# Patient Record
Sex: FEMALE | Race: WHITE | URBAN - METROPOLITAN AREA
[De-identification: names, ages, dates, MRNs, and addresses within clinical notes are randomized per-mention and may not be internally consistent; named-entity substitution may affect disease eponyms.]

---

## 2021-12-23 ENCOUNTER — HOSPITAL ENCOUNTER (EMERGENCY)
Age: 86
Discharge: LEFT BEFORE DISCHARGE | End: 2021-12-23
Attending: EMERGENCY MEDICINE
Payer: MEDICARE

## 2021-12-23 ENCOUNTER — APPOINTMENT (OUTPATIENT)
Dept: GENERAL RADIOLOGY | Age: 86
End: 2021-12-23
Payer: MEDICARE

## 2021-12-23 VITALS
RESPIRATION RATE: 16 BRPM | WEIGHT: 134 LBS | DIASTOLIC BLOOD PRESSURE: 80 MMHG | TEMPERATURE: 98.9 F | BODY MASS INDEX: 22.33 KG/M2 | OXYGEN SATURATION: 96 % | HEART RATE: 84 BPM | SYSTOLIC BLOOD PRESSURE: 135 MMHG | HEIGHT: 65 IN

## 2021-12-23 LAB — SARS-COV-2, NAAT: NOT DETECTED

## 2021-12-23 PROCEDURE — 4500000002 HC ER NO CHARGE

## 2021-12-23 PROCEDURE — 87635 SARS-COV-2 COVID-19 AMP PRB: CPT

## 2021-12-23 PROCEDURE — 71046 X-RAY EXAM CHEST 2 VIEWS: CPT

## 2021-12-23 NOTE — ED TRIAGE NOTES
A & Ox4. Skin pink warm and dry. Harsh cough noted. Denies coughing anything up. Daughter states she has been sleeping all day and not eating anything over the last few days.

## 2024-08-25 ENCOUNTER — APPOINTMENT (OUTPATIENT)
Dept: CARDIOLOGY | Facility: HOSPITAL | Age: 89
End: 2024-08-25
Payer: MEDICARE

## 2024-08-25 ENCOUNTER — APPOINTMENT (OUTPATIENT)
Dept: RADIOLOGY | Facility: HOSPITAL | Age: 89
End: 2024-08-25
Payer: MEDICARE

## 2024-08-25 ENCOUNTER — HOSPITAL ENCOUNTER (INPATIENT)
Facility: HOSPITAL | Age: 89
LOS: 3 days | Discharge: NURSING, REHAB OR LONG-TERM CARE | End: 2024-08-28
Attending: STUDENT IN AN ORGANIZED HEALTH CARE EDUCATION/TRAINING PROGRAM | Admitting: INTERNAL MEDICINE
Payer: MEDICARE
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
## 2024-08-25 DIAGNOSIS — S72.001A CLOSED FRACTURE OF NECK OF RIGHT FEMUR, INITIAL ENCOUNTER (MULTI): ICD-10-CM

## 2024-08-25 DIAGNOSIS — E87.6 HYPOKALEMIA: ICD-10-CM

## 2024-08-25 LAB
ALBUMIN SERPL BCP-MCNC: 3.9 G/DL (ref 3.4–5)
ALP SERPL-CCNC: 119 U/L (ref 33–136)
ALT SERPL W P-5'-P-CCNC: 4 U/L (ref 7–45)
ANION GAP SERPL CALC-SCNC: 17 MMOL/L (ref 10–20)
APPEARANCE UR: CLEAR
AST SERPL W P-5'-P-CCNC: 18 U/L (ref 9–39)
BASOPHILS # BLD AUTO: 0.03 X10*3/UL (ref 0–0.1)
BASOPHILS NFR BLD AUTO: 0.3 %
BILIRUB SERPL-MCNC: 1.3 MG/DL (ref 0–1.2)
BILIRUB UR STRIP.AUTO-MCNC: NEGATIVE MG/DL
BUN SERPL-MCNC: 7 MG/DL (ref 6–23)
CALCIUM SERPL-MCNC: 9.1 MG/DL (ref 8.6–10.3)
CARDIAC TROPONIN I PNL SERPL HS: 17 NG/L (ref 0–13)
CARDIAC TROPONIN I PNL SERPL HS: 17 NG/L (ref 0–13)
CARDIAC TROPONIN I PNL SERPL HS: 20 NG/L (ref 0–13)
CHLORIDE SERPL-SCNC: 103 MMOL/L (ref 98–107)
CO2 SERPL-SCNC: 23 MMOL/L (ref 21–32)
COLOR UR: COLORLESS
CREAT SERPL-MCNC: 0.63 MG/DL (ref 0.5–1.05)
EGFRCR SERPLBLD CKD-EPI 2021: 81 ML/MIN/1.73M*2
EOSINOPHIL # BLD AUTO: 0.24 X10*3/UL (ref 0–0.4)
EOSINOPHIL NFR BLD AUTO: 2.8 %
ERYTHROCYTE [DISTWIDTH] IN BLOOD BY AUTOMATED COUNT: 12.8 % (ref 11.5–14.5)
GLUCOSE SERPL-MCNC: 157 MG/DL (ref 74–99)
GLUCOSE UR STRIP.AUTO-MCNC: NORMAL MG/DL
HCT VFR BLD AUTO: 40.3 % (ref 36–46)
HGB BLD-MCNC: 13.7 G/DL (ref 12–16)
HOLD SPECIMEN: NORMAL
IMM GRANULOCYTES # BLD AUTO: 0.07 X10*3/UL (ref 0–0.5)
IMM GRANULOCYTES NFR BLD AUTO: 0.8 % (ref 0–0.9)
KETONES UR STRIP.AUTO-MCNC: ABNORMAL MG/DL
LEUKOCYTE ESTERASE UR QL STRIP.AUTO: ABNORMAL
LYMPHOCYTES # BLD AUTO: 2.2 X10*3/UL (ref 0.8–3)
LYMPHOCYTES NFR BLD AUTO: 25.3 %
MAGNESIUM SERPL-MCNC: 1.7 MG/DL (ref 1.6–2.4)
MCH RBC QN AUTO: 30.5 PG (ref 26–34)
MCHC RBC AUTO-ENTMCNC: 34 G/DL (ref 32–36)
MCV RBC AUTO: 90 FL (ref 80–100)
MONOCYTES # BLD AUTO: 0.82 X10*3/UL (ref 0.05–0.8)
MONOCYTES NFR BLD AUTO: 9.4 %
NEUTROPHILS # BLD AUTO: 5.33 X10*3/UL (ref 1.6–5.5)
NEUTROPHILS NFR BLD AUTO: 61.4 %
NITRITE UR QL STRIP.AUTO: NEGATIVE
NRBC BLD-RTO: 0 /100 WBCS (ref 0–0)
PH UR STRIP.AUTO: 6.5 [PH]
PLATELET # BLD AUTO: 321 X10*3/UL (ref 150–450)
POTASSIUM SERPL-SCNC: 2.9 MMOL/L (ref 3.5–5.3)
PROT SERPL-MCNC: 7.1 G/DL (ref 6.4–8.2)
PROT UR STRIP.AUTO-MCNC: NEGATIVE MG/DL
RBC # BLD AUTO: 4.49 X10*6/UL (ref 4–5.2)
RBC # UR STRIP.AUTO: NEGATIVE /UL
RBC #/AREA URNS AUTO: NORMAL /HPF
SODIUM SERPL-SCNC: 140 MMOL/L (ref 136–145)
SP GR UR STRIP.AUTO: 1.01
UROBILINOGEN UR STRIP.AUTO-MCNC: NORMAL MG/DL
WBC # BLD AUTO: 8.7 X10*3/UL (ref 4.4–11.3)
WBC #/AREA URNS AUTO: NORMAL /HPF

## 2024-08-25 PROCEDURE — 70450 CT HEAD/BRAIN W/O DYE: CPT | Performed by: RADIOLOGY

## 2024-08-25 PROCEDURE — 2500000004 HC RX 250 GENERAL PHARMACY W/ HCPCS (ALT 636 FOR OP/ED)

## 2024-08-25 PROCEDURE — 71045 X-RAY EXAM CHEST 1 VIEW: CPT | Performed by: RADIOLOGY

## 2024-08-25 PROCEDURE — 96375 TX/PRO/DX INJ NEW DRUG ADDON: CPT

## 2024-08-25 PROCEDURE — 72128 CT CHEST SPINE W/O DYE: CPT | Performed by: RADIOLOGY

## 2024-08-25 PROCEDURE — 71045 X-RAY EXAM CHEST 1 VIEW: CPT

## 2024-08-25 PROCEDURE — 93005 ELECTROCARDIOGRAM TRACING: CPT

## 2024-08-25 PROCEDURE — 99285 EMERGENCY DEPT VISIT HI MDM: CPT | Mod: 25

## 2024-08-25 PROCEDURE — 84075 ASSAY ALKALINE PHOSPHATASE: CPT

## 2024-08-25 PROCEDURE — 72131 CT LUMBAR SPINE W/O DYE: CPT | Performed by: RADIOLOGY

## 2024-08-25 PROCEDURE — 1100000001 HC PRIVATE ROOM DAILY

## 2024-08-25 PROCEDURE — 73552 X-RAY EXAM OF FEMUR 2/>: CPT | Mod: RIGHT SIDE | Performed by: RADIOLOGY

## 2024-08-25 PROCEDURE — 81001 URINALYSIS AUTO W/SCOPE: CPT

## 2024-08-25 PROCEDURE — 73552 X-RAY EXAM OF FEMUR 2/>: CPT | Mod: RT

## 2024-08-25 PROCEDURE — 2500000001 HC RX 250 WO HCPCS SELF ADMINISTERED DRUGS (ALT 637 FOR MEDICARE OP)

## 2024-08-25 PROCEDURE — 85025 COMPLETE CBC W/AUTO DIFF WBC: CPT

## 2024-08-25 PROCEDURE — 96374 THER/PROPH/DIAG INJ IV PUSH: CPT

## 2024-08-25 PROCEDURE — 72125 CT NECK SPINE W/O DYE: CPT

## 2024-08-25 PROCEDURE — 70450 CT HEAD/BRAIN W/O DYE: CPT

## 2024-08-25 PROCEDURE — 36415 COLL VENOUS BLD VENIPUNCTURE: CPT

## 2024-08-25 PROCEDURE — 72131 CT LUMBAR SPINE W/O DYE: CPT

## 2024-08-25 PROCEDURE — 87086 URINE CULTURE/COLONY COUNT: CPT | Mod: PARLAB

## 2024-08-25 PROCEDURE — 83735 ASSAY OF MAGNESIUM: CPT

## 2024-08-25 PROCEDURE — 72128 CT CHEST SPINE W/O DYE: CPT

## 2024-08-25 PROCEDURE — 84484 ASSAY OF TROPONIN QUANT: CPT

## 2024-08-25 PROCEDURE — 72125 CT NECK SPINE W/O DYE: CPT | Performed by: RADIOLOGY

## 2024-08-25 PROCEDURE — 72170 X-RAY EXAM OF PELVIS: CPT | Performed by: RADIOLOGY

## 2024-08-25 PROCEDURE — 72170 X-RAY EXAM OF PELVIS: CPT

## 2024-08-25 RX ORDER — KETOROLAC TROMETHAMINE 30 MG/ML
7.5 INJECTION, SOLUTION INTRAMUSCULAR; INTRAVENOUS EVERY 6 HOURS PRN
Status: DISCONTINUED | OUTPATIENT
Start: 2024-08-25 | End: 2024-08-25

## 2024-08-25 RX ORDER — HYDRALAZINE HYDROCHLORIDE 20 MG/ML
5 INJECTION INTRAMUSCULAR; INTRAVENOUS EVERY 6 HOURS PRN
Status: DISCONTINUED | OUTPATIENT
Start: 2024-08-25 | End: 2024-08-28 | Stop reason: HOSPADM

## 2024-08-25 RX ORDER — ACETAMINOPHEN 160 MG/5ML
650 SOLUTION ORAL EVERY 4 HOURS PRN
Status: DISCONTINUED | OUTPATIENT
Start: 2024-08-25 | End: 2024-08-28 | Stop reason: HOSPADM

## 2024-08-25 RX ORDER — ACETAMINOPHEN 650 MG/1
650 SUPPOSITORY RECTAL EVERY 4 HOURS PRN
Status: DISCONTINUED | OUTPATIENT
Start: 2024-08-25 | End: 2024-08-28 | Stop reason: HOSPADM

## 2024-08-25 RX ORDER — POTASSIUM CHLORIDE 1.5 G/1.58G
20 POWDER, FOR SOLUTION ORAL ONCE
Status: COMPLETED | OUTPATIENT
Start: 2024-08-25 | End: 2024-08-25

## 2024-08-25 RX ORDER — POTASSIUM CHLORIDE 14.9 MG/ML
20 INJECTION INTRAVENOUS
Status: COMPLETED | OUTPATIENT
Start: 2024-08-25 | End: 2024-08-25

## 2024-08-25 RX ORDER — POLYETHYLENE GLYCOL 3350 17 G/17G
17 POWDER, FOR SOLUTION ORAL DAILY PRN
Status: DISCONTINUED | OUTPATIENT
Start: 2024-08-25 | End: 2024-08-28 | Stop reason: HOSPADM

## 2024-08-25 RX ORDER — METOPROLOL TARTRATE 1 MG/ML
5 INJECTION, SOLUTION INTRAVENOUS EVERY 6 HOURS PRN
Status: DISCONTINUED | OUTPATIENT
Start: 2024-08-25 | End: 2024-08-25

## 2024-08-25 RX ORDER — MORPHINE SULFATE 2 MG/ML
1 INJECTION, SOLUTION INTRAMUSCULAR; INTRAVENOUS EVERY 4 HOURS PRN
Status: DISCONTINUED | OUTPATIENT
Start: 2024-08-25 | End: 2024-08-28 | Stop reason: HOSPADM

## 2024-08-25 RX ORDER — MORPHINE SULFATE 2 MG/ML
2 INJECTION, SOLUTION INTRAMUSCULAR; INTRAVENOUS ONCE
Status: COMPLETED | OUTPATIENT
Start: 2024-08-25 | End: 2024-08-25

## 2024-08-25 RX ORDER — LEVOTHYROXINE SODIUM 50 UG/1
50 TABLET ORAL DAILY
Status: DISCONTINUED | OUTPATIENT
Start: 2024-08-26 | End: 2024-08-28 | Stop reason: HOSPADM

## 2024-08-25 RX ORDER — ACETAMINOPHEN 325 MG/1
650 TABLET ORAL EVERY 6 HOURS PRN
Status: DISCONTINUED | OUTPATIENT
Start: 2024-08-25 | End: 2024-08-28 | Stop reason: HOSPADM

## 2024-08-25 RX ORDER — ACETAMINOPHEN 500 MG
5 TABLET ORAL NIGHTLY PRN
Status: DISCONTINUED | OUTPATIENT
Start: 2024-08-25 | End: 2024-08-28 | Stop reason: HOSPADM

## 2024-08-25 SDOH — SOCIAL STABILITY: SOCIAL INSECURITY: WERE YOU ABLE TO COMPLETE ALL THE BEHAVIORAL HEALTH SCREENINGS?: YES

## 2024-08-25 SDOH — SOCIAL STABILITY: SOCIAL INSECURITY: DO YOU FEEL ANYONE HAS EXPLOITED OR TAKEN ADVANTAGE OF YOU FINANCIALLY OR OF YOUR PERSONAL PROPERTY?: NO

## 2024-08-25 SDOH — SOCIAL STABILITY: SOCIAL INSECURITY: DOES ANYONE TRY TO KEEP YOU FROM HAVING/CONTACTING OTHER FRIENDS OR DOING THINGS OUTSIDE YOUR HOME?: NO

## 2024-08-25 SDOH — SOCIAL STABILITY: SOCIAL INSECURITY: HAS ANYONE EVER THREATENED TO HURT YOUR FAMILY OR YOUR PETS?: NO

## 2024-08-25 SDOH — SOCIAL STABILITY: SOCIAL INSECURITY: ARE THERE ANY APPARENT SIGNS OF INJURIES/BEHAVIORS THAT COULD BE RELATED TO ABUSE/NEGLECT?: NO

## 2024-08-25 SDOH — SOCIAL STABILITY: SOCIAL INSECURITY: HAVE YOU HAD THOUGHTS OF HARMING ANYONE ELSE?: NO

## 2024-08-25 SDOH — SOCIAL STABILITY: SOCIAL INSECURITY: HAVE YOU HAD ANY THOUGHTS OF HARMING ANYONE ELSE?: NO

## 2024-08-25 SDOH — SOCIAL STABILITY: SOCIAL INSECURITY: DO YOU FEEL UNSAFE GOING BACK TO THE PLACE WHERE YOU ARE LIVING?: NO

## 2024-08-25 SDOH — SOCIAL STABILITY: SOCIAL INSECURITY: ARE YOU OR HAVE YOU BEEN THREATENED OR ABUSED PHYSICALLY, EMOTIONALLY, OR SEXUALLY BY ANYONE?: NO

## 2024-08-25 SDOH — SOCIAL STABILITY: SOCIAL INSECURITY: ABUSE: ADULT

## 2024-08-25 ASSESSMENT — COLUMBIA-SUICIDE SEVERITY RATING SCALE - C-SSRS
6. HAVE YOU EVER DONE ANYTHING, STARTED TO DO ANYTHING, OR PREPARED TO DO ANYTHING TO END YOUR LIFE?: NO
1. SINCE LAST CONTACT, HAVE YOU WISHED YOU WERE DEAD OR WISHED YOU COULD GO TO SLEEP AND NOT WAKE UP?: NO
2. HAVE YOU ACTUALLY HAD ANY THOUGHTS OF KILLING YOURSELF?: NO
1. IN THE PAST MONTH, HAVE YOU WISHED YOU WERE DEAD OR WISHED YOU COULD GO TO SLEEP AND NOT WAKE UP?: NO
2. HAVE YOU ACTUALLY HAD ANY THOUGHTS OF KILLING YOURSELF?: NO
6. HAVE YOU EVER DONE ANYTHING, STARTED TO DO ANYTHING, OR PREPARED TO DO ANYTHING TO END YOUR LIFE?: NO

## 2024-08-25 ASSESSMENT — PAIN - FUNCTIONAL ASSESSMENT
PAIN_FUNCTIONAL_ASSESSMENT: 0-10

## 2024-08-25 ASSESSMENT — PAIN SCALES - GENERAL
PAINLEVEL_OUTOF10: 6
PAINLEVEL_OUTOF10: 3
PAINLEVEL_OUTOF10: 0 - NO PAIN
PAINLEVEL_OUTOF10: 3
PAINLEVEL_OUTOF10: 0 - NO PAIN

## 2024-08-25 ASSESSMENT — LIFESTYLE VARIABLES
HOW OFTEN DO YOU HAVE 6 OR MORE DRINKS ON ONE OCCASION: NEVER
AUDIT-C TOTAL SCORE: 0
SUBSTANCE_ABUSE_PAST_12_MONTHS: NO
HOW MANY STANDARD DRINKS CONTAINING ALCOHOL DO YOU HAVE ON A TYPICAL DAY: PATIENT DOES NOT DRINK
TOTAL SCORE: 0
AUDIT-C TOTAL SCORE: 0
SKIP TO QUESTIONS 9-10: 1
EVER HAD A DRINK FIRST THING IN THE MORNING TO STEADY YOUR NERVES TO GET RID OF A HANGOVER: NO
HAVE YOU EVER FELT YOU SHOULD CUT DOWN ON YOUR DRINKING: NO
HAVE PEOPLE ANNOYED YOU BY CRITICIZING YOUR DRINKING: NO
EVER FELT BAD OR GUILTY ABOUT YOUR DRINKING: NO
HOW OFTEN DO YOU HAVE A DRINK CONTAINING ALCOHOL: NEVER
PRESCIPTION_ABUSE_PAST_12_MONTHS: NO

## 2024-08-25 ASSESSMENT — ACTIVITIES OF DAILY LIVING (ADL)
EFFECT OF PAIN ON DAILY ACTIVITIES: RESTING
LACK_OF_TRANSPORTATION: NO
JUDGMENT_ADEQUATE_SAFELY_COMPLETE_DAILY_ACTIVITIES: NO
WALKS IN HOME: NEEDS ASSISTANCE
TOILETING: DEPENDENT
HEARING - LEFT EAR: HEARING AID
PATIENT'S MEMORY ADEQUATE TO SAFELY COMPLETE DAILY ACTIVITIES?: NO
GROOMING: DEPENDENT
FEEDING YOURSELF: INDEPENDENT
ADEQUATE_TO_COMPLETE_ADL: YES
DRESSING YOURSELF: DEPENDENT
BATHING: DEPENDENT
HEARING - RIGHT EAR: HEARING AID

## 2024-08-25 ASSESSMENT — PATIENT HEALTH QUESTIONNAIRE - PHQ9
1. LITTLE INTEREST OR PLEASURE IN DOING THINGS: NOT AT ALL
2. FEELING DOWN, DEPRESSED OR HOPELESS: NOT AT ALL
SUM OF ALL RESPONSES TO PHQ9 QUESTIONS 1 & 2: 0

## 2024-08-25 ASSESSMENT — PAIN DESCRIPTION - LOCATION: LOCATION: HIP

## 2024-08-25 ASSESSMENT — COGNITIVE AND FUNCTIONAL STATUS - GENERAL
DAILY ACTIVITIY SCORE: 15
MOVING FROM LYING ON BACK TO SITTING ON SIDE OF FLAT BED WITH BEDRAILS: A LITTLE
PATIENT BASELINE BEDBOUND: NO
MOBILITY SCORE: 15
TURNING FROM BACK TO SIDE WHILE IN FLAT BAD: A LITTLE
WALKING IN HOSPITAL ROOM: A LOT
DRESSING REGULAR LOWER BODY CLOTHING: A LOT
PERSONAL GROOMING: A LITTLE
TOILETING: A LOT
DRESSING REGULAR UPPER BODY CLOTHING: A LITTLE
HELP NEEDED FOR BATHING: A LOT
STANDING UP FROM CHAIR USING ARMS: A LITTLE
MOVING TO AND FROM BED TO CHAIR: A LOT
EATING MEALS: A LITTLE
CLIMB 3 TO 5 STEPS WITH RAILING: A LOT

## 2024-08-25 NOTE — ED NOTES
Olvin called stated she was daughter in law of patient. Advised that I cannot release information on the patient without patient consent. (Patient is not in the room to consent at the time of the call.) olvin stated she was just going to have her grandson come up to the hospital and she will be coming after, and hung up.      Betsy Oliver RN  08/25/24 0619

## 2024-08-25 NOTE — CARE PLAN
The patient's goals for the shift include      The clinical goals for the shift include pt to remain hemodynamically stable throughout duration of shift      Problem: Skin  Goal: Decreased wound size/increased tissue granulation at next dressing change  Outcome: Progressing  Flowsheets (Taken 8/25/2024 1403)  Decreased wound size/increased tissue granulation at next dressing change: Promote sleep for wound healing  Goal: Participates in plan/prevention/treatment measures  Outcome: Progressing  Flowsheets (Taken 8/25/2024 1403)  Participates in plan/prevention/treatment measures: Elevate heels  Goal: Prevent/manage excess moisture  Outcome: Progressing  Flowsheets (Taken 8/25/2024 1403)  Prevent/manage excess moisture: Cleanse incontinence/protect with barrier cream  Goal: Prevent/minimize sheer/friction injuries  Outcome: Progressing  Flowsheets (Taken 8/25/2024 1403)  Prevent/minimize sheer/friction injuries: Use pull sheet  Goal: Promote/optimize nutrition  Outcome: Progressing  Flowsheets (Taken 8/25/2024 1403)  Promote/optimize nutrition: Consume > 50% meals/supplements  Goal: Promote skin healing  Outcome: Progressing  Flowsheets (Taken 8/25/2024 1403)  Promote skin healing: Assess skin/pad under line(s)/device(s)     Problem: Fall/Injury  Goal: Not fall by end of shift  Outcome: Progressing  Goal: Be free from injury by end of the shift  Outcome: Progressing  Goal: Verbalize understanding of personal risk factors for fall in the hospital  Outcome: Progressing  Goal: Verbalize understanding of risk factor reduction measures to prevent injury from fall in the home  Outcome: Progressing  Goal: Use assistive devices by end of the shift  Outcome: Progressing  Goal: Pace activities to prevent fatigue by end of the shift  Outcome: Progressing

## 2024-08-25 NOTE — H&P
History Of Present Illness  Della Gordillo is a 89 y.o. female with a past medical history of DDD (cervical), hypercholesterolemia, hyperlipidemia, hypothyroidism, osteoporosis, and recent L1 compression fracture who presents to the emergency department today by ambulance from the [facility] for a fall.  Per EMS, they were called to the [facility] this morning after patient was found on the floor out of bed. Upon my arrival, I have just woken patient out of sleep, so she is mildly confused.  She asked me where the dog is.  I believe she was just dreaming, because when I asked her her name, where she was, and what month it was, she answered all 3 questions correctly.  She is hard of hearing.  She tells me this morning that she fell out of bed, but does not know how.  She denies feeling dizzy or passing out before hand.  She does state that she hit her head, but denies LOC and use of blood thinners.  She does endorse a mild cough, but states she is not coughing anything up.  She denies fever/chills, chest pain, shortness of breath, palpitations, leg swelling, nausea/vomiting, diarrhea, urinary symptoms, numbness/tingling.  She states that she takes her medications daily, and that the staff at the [facility] watches her take them.  She states she is eating and drinking appropriately.  She states she uses a wheelchair to get around, but it is kept in a corner of her room that she cannot get to.  I informed her that she has broken her hip, and patient states she does not want any surgery. Patient has DNRCC form in her nursing home paperwork.    ED course: On arrival, patient's blood pressure 176/117, heart rate 79, respirations 17, afebrile, saturating 96% on room air.  Patient now saturating 91% on room air, BP now 176/92.  Labs and imaging performed, revealing potassium 2.9, initial and delta troponin 17, no leukocytosis or anemia.  CT imaging shows pseudosubluxation of C2 on C3 and C7 on T1.  Aberrant right subclavian artery  and ectasia of the thoracic aorta noted.  No acute intracranial abnormality.  X-ray of right femur shows acute fracture of the neck of the right femur with foreshortening.  EKG, per ED physician, shows normal sinus rate and rhythm with frequent PVCs.  No STEMI.  Patient given potassium replacement and morphine in the ED.  Patient to be admitted inpatient under Dr. Junior.     Past Medical History  Past Medical History:   Diagnosis Date    H/O degenerative disc disease     HLD (hyperlipidemia)     Hypothyroidism     Osteoporosis        Surgical History  History reviewed. No pertinent surgical history.     Social History  She has no history on file for tobacco use, alcohol use, and drug use.    Family History  No family history on file.     Allergies  Patient has no known allergies.    Review of Systems     Physical Exam  Constitutional:       General: She is not in acute distress.     Appearance: She is not ill-appearing or toxic-appearing.      Comments: Patient mildly confused when I arrive for my interview. I have just woken her out of sleep, she asks me where the dog is. Once she wakes up more, she is able to tell me her name, where she is, and what month it is.    HENT:      Head: Normocephalic and atraumatic.      Nose: Nose normal.      Mouth/Throat:      Mouth: Mucous membranes are dry.      Pharynx: Oropharynx is clear.   Eyes:      Extraocular Movements: Extraocular movements intact.      Conjunctiva/sclera: Conjunctivae normal.      Pupils: Pupils are equal, round, and reactive to light.   Cardiovascular:      Rate and Rhythm: Normal rate and regular rhythm.      Pulses: Normal pulses.   Pulmonary:      Effort: Pulmonary effort is normal.      Breath sounds: Normal breath sounds.   Abdominal:      General: Abdomen is flat. Bowel sounds are normal.      Palpations: Abdomen is soft.      Tenderness: There is no abdominal tenderness.   Musculoskeletal:         General: Tenderness (R hip TTP. Patient tells me  "not to move it.) present.      Cervical back: Tenderness present.      Right lower leg: No edema.      Left lower leg: No edema.   Neurological:      General: No focal deficit present.      Mental Status: She is oriented to person, place, and time.      Sensory: No sensory deficit.      Comments: Neurovascular status intact of BLE.  Strength and sensation intact bilaterally.   Psychiatric:         Mood and Affect: Mood normal.         Behavior: Behavior normal.         Thought Content: Thought content normal.          Last Recorded Vitals  Blood pressure (!) 176/92, pulse 71, temperature 36.4 °C (97.5 °F), resp. rate 16, height 1.676 m (5' 6\"), weight 68 kg (150 lb), SpO2 (!) 91%.    Relevant Results    Scheduled medications  potassium chloride, 20 mEq, oral, Once  potassium chloride, 20 mEq, intravenous, q2h      Continuous medications     PRN medications  PRN medications: ketorolac, polyethylene glycol    Results for orders placed or performed during the hospital encounter of 08/25/24 (from the past 24 hour(s))   CBC and Auto Differential   Result Value Ref Range    WBC 8.7 4.4 - 11.3 x10*3/uL    nRBC 0.0 0.0 - 0.0 /100 WBCs    RBC 4.49 4.00 - 5.20 x10*6/uL    Hemoglobin 13.7 12.0 - 16.0 g/dL    Hematocrit 40.3 36.0 - 46.0 %    MCV 90 80 - 100 fL    MCH 30.5 26.0 - 34.0 pg    MCHC 34.0 32.0 - 36.0 g/dL    RDW 12.8 11.5 - 14.5 %    Platelets 321 150 - 450 x10*3/uL    Neutrophils % 61.4 40.0 - 80.0 %    Immature Granulocytes %, Automated 0.8 0.0 - 0.9 %    Lymphocytes % 25.3 13.0 - 44.0 %    Monocytes % 9.4 2.0 - 10.0 %    Eosinophils % 2.8 0.0 - 6.0 %    Basophils % 0.3 0.0 - 2.0 %    Neutrophils Absolute 5.33 1.60 - 5.50 x10*3/uL    Immature Granulocytes Absolute, Automated 0.07 0.00 - 0.50 x10*3/uL    Lymphocytes Absolute 2.20 0.80 - 3.00 x10*3/uL    Monocytes Absolute 0.82 (H) 0.05 - 0.80 x10*3/uL    Eosinophils Absolute 0.24 0.00 - 0.40 x10*3/uL    Basophils Absolute 0.03 0.00 - 0.10 x10*3/uL   Comprehensive " metabolic panel   Result Value Ref Range    Glucose 157 (H) 74 - 99 mg/dL    Sodium 140 136 - 145 mmol/L    Potassium 2.9 (LL) 3.5 - 5.3 mmol/L    Chloride 103 98 - 107 mmol/L    Bicarbonate 23 21 - 32 mmol/L    Anion Gap 17 10 - 20 mmol/L    Urea Nitrogen 7 6 - 23 mg/dL    Creatinine 0.63 0.50 - 1.05 mg/dL    eGFR 81 >60 mL/min/1.73m*2    Calcium 9.1 8.6 - 10.3 mg/dL    Albumin 3.9 3.4 - 5.0 g/dL    Alkaline Phosphatase 119 33 - 136 U/L    Total Protein 7.1 6.4 - 8.2 g/dL    AST 18 9 - 39 U/L    Bilirubin, Total 1.3 (H) 0.0 - 1.2 mg/dL    ALT 4 (L) 7 - 45 U/L   Magnesium   Result Value Ref Range    Magnesium 1.70 1.60 - 2.40 mg/dL   Troponin I, High Sensitivity   Result Value Ref Range    Troponin I, High Sensitivity 17 (H) 0 - 13 ng/L   Troponin I, High Sensitivity   Result Value Ref Range    Troponin I, High Sensitivity 17 (H) 0 - 13 ng/L     XR pelvis 1-2 views    Result Date: 8/25/2024  Interpreted By:  Amada Chang, STUDY: XR PELVIS 1-2 VIEWS; ;  8/25/2024 8:12 am   INDICATION: Signs/Symptoms:right hip/upper femur pain.   COMPARISON: None.   ACCESSION NUMBER(S): [id number]   ORDERING CLINICIAN: ALLISON NEIL   FINDINGS: AP view of the pelvis shows an acute right femoral neck fracture with foreshortening. The remaining bones and joints appear intact       Acute displaced right femoral neck fracture     MACRO: None   Signed by: Amada Chang 8/25/2024 8:29 AM Dictation workstation:   [id number]    XR femur right 2+ views    Result Date: 8/25/2024  Interpreted By:  Amada Chang, STUDY: XR FEMUR RIGHT 2+ VIEWS; ;  8/25/2024 8:12 am   INDICATION: Signs/Symptoms:fall/tender proximal.   COMPARISON: None.   ACCESSION NUMBER(S): [id number]   ORDERING CLINICIAN: CHIKA MARTINEZ   FINDINGS: Two views right femur   There is a fracture through the right femoral neck with foreshortening. Degenerative changes are seen on the greater trochanter. Sclerosis of the distal right femur probably represents a bone infarct.        Acute fracture of the neck of the right femur with foreshortening     MACRO: None   Signed by: Amada Chang 8/25/2024 8:28 AM Dictation workstation:   [id number]    XR chest 1 view    Result Date: 8/25/2024  Interpreted By:  Amada Chang, STUDY: XR CHEST 1 VIEW 8/25/2024 8:12 am   INDICATION: Signs/Symptoms:fall   COMPARISON: None   ACCESSION NUMBER(S): [id number]   ORDERING CLINICIAN: CHIKA MARTINEZ   TECHNIQUE: AP view   FINDINGS: Is enlargement of the cardiac silhouette. Pulmonary vascularity is normal. No infiltrates. No pneumothorax or pleural effusion. Healed right rib fractures are noted       No acute abnormalities   Signed by: Amada Chang 8/25/2024 8:27 AM Dictation workstation:   [id number]    CT cervical spine wo IV contrast    Result Date: 8/25/2024  Interpreted By:  Amada Chang, STUDY: CT CERVICAL SPINE WO IV CONTRAST;  8/25/2024 7:00 am   INDICATION: Signs/Symptoms:fall.   COMPARISON: None.   ACCESSION NUMBER(S): [id number]   ORDERING CLINICIAN: CIHKA MARTINEZ   TECHNIQUE: Axial CT images of the cervical spine are obtained. Axial, coronal and sagittal reconstructions are provided for review.   All CT exams are performed with 1 or more of the following dose reduction techniques: Automatic exposure control, adjustment of mA and/or kv according to patient size, or use of iterative reconstruction techniques.   FINDINGS: There is straightening of the cervical lordosis. Multilevel disc space narrowing and spurring noted with facet and uncovertebral joint hypertrophy. Arthritic pseudo subluxation of C2 on C3 and C7 on T1 noted bony encroachment on neural foramina due to uncovertebral joint hypertrophy most severe involving the right C6-7 foramen.   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 Aortic arch has short axis measurement of 3.3 cm         1. No acute fracture or traumatic subluxation 2. Multilevel cervical spondylosis   MACRO: None   Signed by: Amada Chang  8/25/2024 8:10 AM Dictation workstation:   [id number]    CT thoracic spine wo IV contrast    Result Date: 8/25/2024  Interpreted By:  Amada Chang, STUDY: CT THORACIC SPINE WO IV CONTRAST;  8/25/2024 7:00 am   INDICATION: Signs/Symptoms:fall.   COMPARISON: None.   ACCESSION NUMBER(S): [id number]   ORDERING CLINICIAN: CHIKA MARTINEZ   TECHNIQUE: Axial CT images of the thoracic spine are obtained. Axial, coronal and sagittal reconstructions are submitted for review.   FINDINGS: There is an acute compression fracture of the superior endplate of L1 with minimal retropulsion of the posterosuperior endplate.   The remaining thoracic vertebral body heights are maintained. Alignment is satisfactory. Disc space narrowing and spurring is seen throughout the thoracic spine. No bony canal stenosis or bony foraminal stenosis noted.   Incidental note is made of an aberrant right subclavian artery. Ectasia of the ascending thoracic aorta measures 3.9 cm short axis. Mild cardiomegaly with mild atherosclerotic coronary artery calcifications. Coarsened interstitial markings in the lung bases probably due to chronic change mild scoliosis noted.         1. Diffuse thoracic spondylosis and mild scoliosis. No acute bony abnormalities in the thoracic spine. 2. Acute compression fracture of L1     MACRO: None   Signed by: Amada Chang 8/25/2024 8:04 AM Dictation workstation:   [id number]    CT lumbar spine wo IV contrast    Result Date: 8/25/2024  Interpreted By:  Amada Chang, STUDY: CT LUMBAR SPINE WO IV CONTRAST  8/25/2024 7:00 am   INDICATION: Signs/Symptoms:fall, history of recent lumbar fracture   COMPARISON: None.   ACCESSION NUMBER(S): [id number]   ORDERING CLINICIAN: CHIKA MARTINEZ   TECHNIQUE: Axial CT images of the lumbar spine are obtained. Axial, coronal and sagittal reconstructions are provided for review.   All CT exams are performed with 1 or more of the following dose reduction techniques: Automatic exposure  control, adjustment of mA and/or kv according to patient size, or use of iterative reconstruction techniques.   FINDINGS: T12-L1: There is an acute compression fracture of the superior endplate of L1 with 2 mm of retropulsion of the posterosuperior endplate. Minimal bony canal stenosis. No foraminal stenosis.   L1-2: There is a chronic appearing compression fracture of the superior endplate of L2. There is a broad-based disc bulge. There is no bony canal or bony foraminal stenosis. Schmorl's node also noted in the superior endplate of L2.   L2-3: Small posterior osteophyte disc complex effaces the ventral thecal sac. Posterior element hypertrophy is noted. There is lumbar canal stenosis. No bony foraminal stenosis.   L3-4: There is 2 mm of anterior subluxation of L3 on L4 with a broad-based disc bulge and posterior element hypertrophy. Canal stenosis due to degenerative changes. No bony foraminal stenosis   L4-5: Slight disc space narrowing with broad-based disc bulge and posterior element hypertrophy. Canal stenosis noted due to degenerative changes.   L5-S1: Broad-based disc bulge. No bony canal or bony foraminal stenosis.   Additional findings include hyperdense material layering in the gallbladder and colonic diverticulosis.       1. Acute compression fracture of L1 with 2 mm of retropulsion of the posterosuperior endplate causing minimal bony canal stenosis. 2. Remote appearing L2 compression fracture 3. Multilevel lumbar spondylosis   MACRO: None   Signed by: Amada Chang 8/25/2024 8:00 AM Dictation workstation:   [id number]    CT head wo IV contrast    Result Date: 8/25/2024  Interpreted By:  Amada Chang, STUDY: CT HEAD WO IV CONTRAST;  8/25/2024 7:00 am   INDICATION: Signs/Symptoms:fall.   COMPARISON: None..   ACCESSION NUMBER(S): [id number]   ORDERING CLINICIAN: CHIKA MARTINEZ   TECHNIQUE: CT axial images through the Brain were obtained without contrast.   All CT exams are performed with 1 or more of  the following dose reduction techniques: Automatic exposure control, adjustment of mA and/or kv according to patient size, or use of iterative reconstruction techniques.   FINDINGS: There is mass effect or acute intracranial hemorrhage. Tiny remote left basal ganglia lacunar infarct. Ventricles are enlarged likely related to diffuse cerebral atrophy. Gray-white differentiation is maintained.   Paranasal sinus mucosal thickening noted.. Lens replacements are seen.       No acute intracranial abnormality.   MACRO: None.   Signed by: Amada Chang 8/25/2024 7:54 AM Dictation workstation:   [id number]        Assessment/Plan   Assessment & Plan  Fall, initial encounter  Acute displaced right femoral neck fracture  Mechanical fall  History of recent L1 compression fracture  Osteoporosis  -Orthopedic surgery consult and recommendations appreciated  -Imaging results as above  -N.p.o. for possible surgical intervention  -Pain control  -Urinalysis ordered  -PT/OT, fall precautions    Elevated high-sensitivity troponins  Hyperlipidemia  Hypertension  -Initial troponin 17, delta troponin 17.  Third ordered, trend  -EKG, per ED physician, shows normal sinus rate and rhythm with frequent PVCs.  No STEMI    Hypokalemia  -Potassium 2.9 on arrival, replacement ordered in the ED  -Monitor with BMP in the a.m.    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 for possible surgical intervention, continue home medications when able    DVT prophylaxis  -No chemical anticoagulation due to possible surgical intervention  -SCDs    Patient fully evaluated on August 25.  Critical correct electrolyte disturbances before surgical intervention.    I spent 45 minutes in the professional and overall care of this patient.      Herminia Ortega PA-C

## 2024-08-25 NOTE — ASSESSMENT & PLAN NOTE
Acute displaced right femoral neck fracture  Mechanical fall  History of recent L1 compression fracture  Osteoporosis  -Orthopedic surgery consult and recommendations appreciated  -Imaging results as above  -N.p.o. for possible surgical intervention  -Pain control  -Urinalysis ordered  -PT/OT, fall precautions    Elevated high-sensitivity troponins  Hyperlipidemia  Hypertension  -Initial troponin 17, delta troponin 17.  Third ordered, trend  -EKG, per ED physician, shows normal sinus rate and rhythm with frequent PVCs.  No STEMI    Hypokalemia  -Potassium 2.9 on arrival, replacement ordered in the ED  -Monitor with BMP in the a.m.    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 for possible surgical intervention, continue home medications when able    DVT prophylaxis  -No chemical anticoagulation due to possible surgical intervention  -SCDs

## 2024-08-25 NOTE — CARE PLAN
The patient's goals for the shift include      The clinical goals for the shift include pt to remain hemodynamically stable throughout duration of shift      Problem: Skin  Goal: Decreased wound size/increased tissue granulation at next dressing change  Outcome: Progressing  Goal: Participates in plan/prevention/treatment measures  Outcome: Progressing  Goal: Prevent/manage excess moisture  Outcome: Progressing  Goal: Prevent/minimize sheer/friction injuries  Outcome: Progressing  Goal: Promote/optimize nutrition  Outcome: Progressing  Goal: Promote skin healing  Outcome: Progressing     Problem: Fall/Injury  Goal: Not fall by end of shift  Outcome: Progressing  Goal: Be free from injury by end of the shift  Outcome: Progressing  Goal: Verbalize understanding of personal risk factors for fall in the hospital  Outcome: Progressing  Goal: Verbalize understanding of risk factor reduction measures to prevent injury from fall in the home  Outcome: Progressing  Goal: Use assistive devices by end of the shift  Outcome: Progressing  Goal: Pace activities to prevent fatigue by end of the shift  Outcome: Progressing

## 2024-08-25 NOTE — ED PROVIDER NOTES
HPI   Chief Complaint   Patient presents with    Fall     BIBA from the \[facility]\"" for unwitnessed fall, pt states she lost her balance and fell to the floor. +head, -loc , - thinners. Patient is denying any pain, but c/o hip pain that she states is chronic. -n/v, denies HA. Pt placed in c-collar by ems.         Della is a 89 y/o female with a past medical history of hyperlipidemia, hypothyroidism, osteoporosis, and recent lumbar fractures presenting emergency department by ambulance from the [facility] for a fall. EMS reports that this morning they were called to the [facility] after patient was found on the floor out of bed.  Patient reports that she was sleeping this morning and forgot that she is not supposed to get out of bed on her own.  When she time to get out of bed, she fell onto her right side.  Patient believes that she just slipped and fell.  She does not believe she lost consciousness.  She does not use blood thinners.  She denies any lightheadedness, dizziness, chest pain, or shortness of breath prior to the fall.  Patient is currently complaining of right hip pain, but otherwise no symptoms.  She denies headache, nausea, vomiting, visual changes, lightheadedness, dizziness, numbness, tingling, weakness, chest pain, shortness of breath.              Patient History   No past medical history on file.  No past surgical history on file.  No family history on file.  Social History     Tobacco Use    Smoking status: Not on file    Smokeless tobacco: Not on file   Substance Use Topics    Alcohol use: Not on file    Drug use: Not on file       Physical Exam   ED Triage Vitals [08/25/24 0622]   Temperature Heart Rate Respirations BP   36.4 °C (97.5 °F) 79 17 (!) 176/117      Pulse Ox Temp src Heart Rate Source Patient Position   96 % -- Monitor Sitting      BP Location FiO2 (%)     Right arm --       Physical Exam  Constitutional:       Comments: Patient is an elderly female that is generally well-appearing and in no  acute distress.  She is alert oriented x 3.   HENT:      Head:      Comments: No Graff sign or raccoon eyes.     Right Ear: Tympanic membrane, ear canal and external ear normal.      Left Ear: Tympanic membrane, ear canal and external ear normal.      Ears:      Comments: No hemotympanum.     Mouth/Throat:      Mouth: Mucous membranes are moist.      Pharynx: Oropharynx is clear.   Eyes:      Extraocular Movements: Extraocular movements intact.      Pupils: Pupils are equal, round, and reactive to light.   Neck:      Comments: Patient does not have any tenderness palpation of the midline cervical, thoracic, or lumbar spine.  She is in a cervical collar.   Cardiovascular:      Rate and Rhythm: Normal rate and regular rhythm.      Pulses: Normal pulses.      Heart sounds: Normal heart sounds.   Pulmonary:      Effort: Pulmonary effort is normal. No respiratory distress.      Breath sounds: Normal breath sounds. No stridor. No wheezing, rhonchi or rales.   Chest:      Chest wall: No tenderness.   Abdominal:      General: Abdomen is flat. There is no distension.      Palpations: Abdomen is soft.      Tenderness: There is no abdominal tenderness. There is no guarding or rebound.   Musculoskeletal:         General: Tenderness and signs of injury present. No swelling or deformity.      Cervical back: Normal range of motion. No rigidity or tenderness.      Comments: Patient with tenderness palpation of the right hip.  No obvious deformity, soft tissue swelling.  She has decreased range of motion of the right lower leg due to tenderness.  No tenderness palpation of the distal femur/proximal tibia/fib.  Patient is neurovascular intact distally.   Neurological:      General: No focal deficit present.      Mental Status: She is alert and oriented to person, place, and time.           ED Course & MDM   ED Course as of 08/25/24 0920   Sun Aug 25, 2024   0656 EKG interpreted by ED physician.  Normal sinus rate and rhythm with  frequent PVCs.  No STEMI..  . [AH]   0725 POTASSIUM(!!): 2.9 [AH]   0806 CT thoracic spine wo IV contrast  IMPRESSION:      1. Diffuse thoracic spondylosis and mild scoliosis. No acute bony  abnormalities in the thoracic spine.  2. Acute compression fracture of L1   [AH]   0806 CT lumbar spine wo IV contrast  1. Acute compression fracture of L1 with 2 mm of retropulsion of the  posterosuperior endplate causing minimal bony canal stenosis.  2. Remote appearing L2 compression fracture  3. Multilevel lumbar spondylosis   [AH]   0806 CT head wo IV contrast  IMPRESSION:  No acute intracranial abnormality.       [AH]   0835 XR pelvis 1-2 views  IMPRESSION:  Acute displaced right femoral neck fracture   [AH]   0835 This is a 89-year-old female who presents after a fall.  Patient has history of dementia and sundowning.  Per family being evaluated for hospice care at this time due to poor p.o. intake and delirium.  Did have a fall last night.  On exam patient is well-appearing.  Did have pain in her right hip.  Neurovascular intact.  X-ray did show a fracture there.  She was also notably hypokalemic.  No other major injuries on examination.  Ortho consulted.  Of note, family was apprehensive to surgical intervention.  Did attempt to begin discussion of risks and benefits although POA was not present at this time.  Case discussed with the hospitalist service and was admitted for further management. []   0836 CT cervical spine wo IV contrast      1. No acute fracture or traumatic subluxation  2. Multilevel cervical spondylosis   [AH]   0836 XR femur right 2+ views  IMPRESSION:  Acute fracture of the neck of the right femur with foreshortening       [AH]   0836 XR chest 1 view  IMPRESSION:  No acute abnormalities   [AH]   0906 Troponin I, High Sensitivity(!): 17 [AH]      ED Course User Index  [AH] Racheal Schilling PA-C  [DS] Woody Allen MD         Diagnoses as of 08/25/24 0920   Fall, initial encounter   Hypokalemia    Closed fracture of neck of right femur, initial encounter (Multi)                 No data recorded     [place] Coma Scale Score: 15 (08/25/24 0624 : Betsy Oliver RN)                           Medical Decision Making  Della is a 89 y/o female with a past medical history of hyperlipidemia, hypothyroidism, osteoporosis, and recent lumbar fractures presenting emergency department by ambulance from the [place] for a fall.     Medical Decision Making: She did not appear ill or toxic.  Vital signs reviewed and stable.  Patient is alert orient x 3.  No focal neurological deficit.  She has tenderness palpation of the right hip.  I am highly suspicious for femur fracture.  Workup included CBC, CMP, magnesium, troponin, EKG, chest x-ray, pelvic x-ray, x-ray of the right femur, CT of the head, cervical spine, thoracic spine, lumbar spine without IV contrast.  There is no leukocytosis on CBC.  H&H are stable.  Hyperglycemia 157 on CMP.  Potassium is critically depleted at 2.9.  There is no hemolysis.  Magnesium is unremarkable.  Initial troponin is elevated at 17.  Patient does not have any chest pain.  Delta pending.  EKG does show ectopy, but no STEMI.  X-ray of the chest shows no acute abnormalities.  Pelvic/right femur x-ray shows acute fracture of the neck of the right femur with foreshortening.  CT imaging of the head demonstrates no acute intracranial abnormality.  CT of the lumbar spine demonstrates an acute compression fracture of L1 and remote appearing L2 compression fracture.  Patient does have recent history of L1 fracture from July.  She is not having tenderness palpation of the lumbar spine.  I suspect this finding is consistent with previous L1 fracture.  She is neurovascular intact distally.  CT imaging of the cervical spine unremarkable.  Cervical collar cleared. Potassium was repleted intravenously and orally.  Patient also provided with morphine for pain control. Patient to require admission to the  hospital in the setting of hyperkalemia and EKG changes and femoral neck fracture.  I spoke with orthopedic surgeon on-call, Dr. Vernon, who is recommending surgical intervention at this time.  Patient was admitted to Dr. Junior.      Differential diagnoses considered: Electrolyte disturbance, CLARITA, arrhythmia, NSTEMI, STEMI, fracture, dislocation, ligamentous tendinous injury, intracranial hemorrhage, etc.     Medications given: Potassium, morphine     Diagnosis: Fall, hypokalemia, closed fracture of neck of right femur    Plan: Admission          Procedure  Procedures     Racheal Schilling PA-C  08/25/24 4770

## 2024-08-25 NOTE — CONSULTS
"Reason For Consult  Displaced right femoral neck fracture    History Of Present Illness  Della Gordillo is a 89 y.o. female presenting with a past medical history of cervical DDD, hypercholesterolemia, hyperlipidemia, hypothyroidism and recent L1 compression fracture for which she was in the hospital 1 month ago.  Patient has not ambulated in over a month because of her previous L1 compression fracture.  She tried to get up yesterday and fell onto the floor complaining of right hip pain.  She was brought to the emergency room x-rays are taken which showed a displaced right femoral neck fracture.  Patient has a history of dementia and is oriented x 1 only.     Past Medical History  She has a past medical history of H/O degenerative disc disease, HLD (hyperlipidemia), Hypothyroidism, and Osteoporosis.    Surgical History  She has no past surgical history on file.     Social History  She has no history on file for tobacco use, alcohol use, and drug use.    Family History  No family history on file.     Allergies  Patient has no known allergies.         Physical Exam  Examination of her right lower extremity shows no swelling no ecchymosis no erythema no cellulitis.  Thigh and calf are soft nontender.  Pain with any attempted internal or external rotation or hip flexion.  Good range of motion the foot and ankle.     Last Recorded Vitals  Blood pressure (!) 178/104, pulse 70, temperature 36.5 °C (97.7 °F), temperature source Temporal, resp. rate 20, height 1.676 m (5' 6\"), weight 68 kg (150 lb), SpO2 97%.    Relevant Results      Scheduled medications  potassium chloride, 20 mEq, intravenous, q2h      Continuous medications     PRN medications  PRN medications: metoprolol, morphine, oxygen, polyethylene glycol  Results for orders placed or performed during the hospital encounter of 08/25/24 (from the past 24 hour(s))   CBC and Auto Differential   Result Value Ref Range    WBC 8.7 4.4 - 11.3 x10*3/uL    nRBC 0.0 0.0 - 0.0 " /100 WBCs    RBC 4.49 4.00 - 5.20 x10*6/uL    Hemoglobin 13.7 12.0 - 16.0 g/dL    Hematocrit 40.3 36.0 - 46.0 %    MCV 90 80 - 100 fL    MCH 30.5 26.0 - 34.0 pg    MCHC 34.0 32.0 - 36.0 g/dL    RDW 12.8 11.5 - 14.5 %    Platelets 321 150 - 450 x10*3/uL    Neutrophils % 61.4 40.0 - 80.0 %    Immature Granulocytes %, Automated 0.8 0.0 - 0.9 %    Lymphocytes % 25.3 13.0 - 44.0 %    Monocytes % 9.4 2.0 - 10.0 %    Eosinophils % 2.8 0.0 - 6.0 %    Basophils % 0.3 0.0 - 2.0 %    Neutrophils Absolute 5.33 1.60 - 5.50 x10*3/uL    Immature Granulocytes Absolute, Automated 0.07 0.00 - 0.50 x10*3/uL    Lymphocytes Absolute 2.20 0.80 - 3.00 x10*3/uL    Monocytes Absolute 0.82 (H) 0.05 - 0.80 x10*3/uL    Eosinophils Absolute 0.24 0.00 - 0.40 x10*3/uL    Basophils Absolute 0.03 0.00 - 0.10 x10*3/uL   Comprehensive metabolic panel   Result Value Ref Range    Glucose 157 (H) 74 - 99 mg/dL    Sodium 140 136 - 145 mmol/L    Potassium 2.9 (LL) 3.5 - 5.3 mmol/L    Chloride 103 98 - 107 mmol/L    Bicarbonate 23 21 - 32 mmol/L    Anion Gap 17 10 - 20 mmol/L    Urea Nitrogen 7 6 - 23 mg/dL    Creatinine 0.63 0.50 - 1.05 mg/dL    eGFR 81 >60 mL/min/1.73m*2    Calcium 9.1 8.6 - 10.3 mg/dL    Albumin 3.9 3.4 - 5.0 g/dL    Alkaline Phosphatase 119 33 - 136 U/L    Total Protein 7.1 6.4 - 8.2 g/dL    AST 18 9 - 39 U/L    Bilirubin, Total 1.3 (H) 0.0 - 1.2 mg/dL    ALT 4 (L) 7 - 45 U/L   Magnesium   Result Value Ref Range    Magnesium 1.70 1.60 - 2.40 mg/dL   Troponin I, High Sensitivity   Result Value Ref Range    Troponin I, High Sensitivity 17 (H) 0 - 13 ng/L   Troponin I, High Sensitivity   Result Value Ref Range    Troponin I, High Sensitivity 17 (H) 0 - 13 ng/L   Troponin I, High Sensitivity   Result Value Ref Range    Troponin I, High Sensitivity 20 (H) 0 - 13 ng/L        Assessment/Plan     Impression: Displaced right femoral neck fracture    Plan: I had a long talk with the patient's daughter Della Christian at [phone].  She used to  work in surgery at [employer] and is familiar with hemiarthroplasties.  I explained to her the treatment options conservative and surgical.  Even though it is very doubtful that she would ever ambulate again being that she has not walked for over a month already.  Surgical intervention with a bipolar hemiarthroplasty would be for pain control and hygiene.  The risk and complications including but not limited to infections, DVT, PE, continued pain and need for later revision were all discussed.  She has a very good understanding of the risk and complications.  She is going to discuss it with the rest of her family and make a decision on possible surgery for tomorrow.    Rupert Vernon, DO

## 2024-08-25 NOTE — ED NOTES
[phone], Alondra (daughter in law)   [phone] Della (daughter)     Want to be called with updates.      Sujey Amaya RN  08/25/24 4222

## 2024-08-26 ENCOUNTER — APPOINTMENT (OUTPATIENT)
Dept: RADIOLOGY | Facility: HOSPITAL | Age: 89
End: 2024-08-26
Payer: MEDICARE

## 2024-08-26 ENCOUNTER — ANESTHESIA (OUTPATIENT)
Dept: OPERATING ROOM | Facility: HOSPITAL | Age: 89
End: 2024-08-26
Payer: MEDICARE

## 2024-08-26 ENCOUNTER — ANESTHESIA EVENT (OUTPATIENT)
Dept: OPERATING ROOM | Facility: HOSPITAL | Age: 89
End: 2024-08-26
Payer: MEDICARE

## 2024-08-26 PROBLEM — S72.001A CLOSED FRACTURE OF NECK OF RIGHT FEMUR (MULTI): Status: ACTIVE | Noted: 2024-08-25

## 2024-08-26 LAB
ABO GROUP (TYPE) IN BLOOD: NORMAL
ABO GROUP (TYPE) IN BLOOD: NORMAL
ALBUMIN SERPL BCP-MCNC: 3.5 G/DL (ref 3.4–5)
ALP SERPL-CCNC: 97 U/L (ref 33–136)
ALT SERPL W P-5'-P-CCNC: 4 U/L (ref 7–45)
ANION GAP SERPL CALC-SCNC: 15 MMOL/L (ref 10–20)
ANTIBODY SCREEN: NORMAL
AST SERPL W P-5'-P-CCNC: 14 U/L (ref 9–39)
BACTERIA UR CULT: ABNORMAL
BILIRUB SERPL-MCNC: 2 MG/DL (ref 0–1.2)
BUN SERPL-MCNC: 10 MG/DL (ref 6–23)
CALCIUM SERPL-MCNC: 8.8 MG/DL (ref 8.6–10.3)
CHLORIDE SERPL-SCNC: 102 MMOL/L (ref 98–107)
CO2 SERPL-SCNC: 24 MMOL/L (ref 21–32)
CREAT SERPL-MCNC: 0.65 MG/DL (ref 0.5–1.05)
EGFRCR SERPLBLD CKD-EPI 2021: 80 ML/MIN/1.73M*2
ERYTHROCYTE [DISTWIDTH] IN BLOOD BY AUTOMATED COUNT: 13.2 % (ref 11.5–14.5)
GLUCOSE SERPL-MCNC: 136 MG/DL (ref 74–99)
HCT VFR BLD AUTO: 37.2 % (ref 36–46)
HGB BLD-MCNC: 12.7 G/DL (ref 12–16)
MCH RBC QN AUTO: 30.4 PG (ref 26–34)
MCHC RBC AUTO-ENTMCNC: 34.1 G/DL (ref 32–36)
MCV RBC AUTO: 89 FL (ref 80–100)
NRBC BLD-RTO: 0 /100 WBCS (ref 0–0)
PLATELET # BLD AUTO: 317 X10*3/UL (ref 150–450)
POTASSIUM SERPL-SCNC: 3.5 MMOL/L (ref 3.5–5.3)
PROT SERPL-MCNC: 6.5 G/DL (ref 6.4–8.2)
RBC # BLD AUTO: 4.18 X10*6/UL (ref 4–5.2)
RH FACTOR (ANTIGEN D): NORMAL
RH FACTOR (ANTIGEN D): NORMAL
SODIUM SERPL-SCNC: 137 MMOL/L (ref 136–145)
WBC # BLD AUTO: 8.9 X10*3/UL (ref 4.4–11.3)

## 2024-08-26 PROCEDURE — 7100000002 HC RECOVERY ROOM TIME - EACH INCREMENTAL 1 MINUTE: Performed by: STUDENT IN AN ORGANIZED HEALTH CARE EDUCATION/TRAINING PROGRAM

## 2024-08-26 PROCEDURE — 36415 COLL VENOUS BLD VENIPUNCTURE: CPT | Performed by: INTERNAL MEDICINE

## 2024-08-26 PROCEDURE — 82565 ASSAY OF CREATININE: CPT | Performed by: INTERNAL MEDICINE

## 2024-08-26 PROCEDURE — 2500000004 HC RX 250 GENERAL PHARMACY W/ HCPCS (ALT 636 FOR OP/ED): Performed by: STUDENT IN AN ORGANIZED HEALTH CARE EDUCATION/TRAINING PROGRAM

## 2024-08-26 PROCEDURE — 1100000001 HC PRIVATE ROOM DAILY

## 2024-08-26 PROCEDURE — C1713 ANCHOR/SCREW BN/BN,TIS/BN: HCPCS | Performed by: STUDENT IN AN ORGANIZED HEALTH CARE EDUCATION/TRAINING PROGRAM

## 2024-08-26 PROCEDURE — 3700000001 HC GENERAL ANESTHESIA TIME - INITIAL BASE CHARGE: Performed by: STUDENT IN AN ORGANIZED HEALTH CARE EDUCATION/TRAINING PROGRAM

## 2024-08-26 PROCEDURE — 2500000005 HC RX 250 GENERAL PHARMACY W/O HCPCS: Performed by: STUDENT IN AN ORGANIZED HEALTH CARE EDUCATION/TRAINING PROGRAM

## 2024-08-26 PROCEDURE — 3600000010 HC OR TIME - EACH INCREMENTAL 1 MINUTE - PROCEDURE LEVEL FIVE: Performed by: STUDENT IN AN ORGANIZED HEALTH CARE EDUCATION/TRAINING PROGRAM

## 2024-08-26 PROCEDURE — 2500000004 HC RX 250 GENERAL PHARMACY W/ HCPCS (ALT 636 FOR OP/ED): Performed by: ANESTHESIOLOGY

## 2024-08-26 PROCEDURE — 2500000004 HC RX 250 GENERAL PHARMACY W/ HCPCS (ALT 636 FOR OP/ED): Performed by: NURSE ANESTHETIST, CERTIFIED REGISTERED

## 2024-08-26 PROCEDURE — 3700000002 HC GENERAL ANESTHESIA TIME - EACH INCREMENTAL 1 MINUTE: Performed by: STUDENT IN AN ORGANIZED HEALTH CARE EDUCATION/TRAINING PROGRAM

## 2024-08-26 PROCEDURE — 7100000001 HC RECOVERY ROOM TIME - INITIAL BASE CHARGE: Performed by: STUDENT IN AN ORGANIZED HEALTH CARE EDUCATION/TRAINING PROGRAM

## 2024-08-26 PROCEDURE — 72170 X-RAY EXAM OF PELVIS: CPT | Performed by: RADIOLOGY

## 2024-08-26 PROCEDURE — A6213 FOAM DRG >16<=48 SQ IN W/BDR: HCPCS | Performed by: STUDENT IN AN ORGANIZED HEALTH CARE EDUCATION/TRAINING PROGRAM

## 2024-08-26 PROCEDURE — A27125 PR PARTIAL HIP REPLACEMENT: Performed by: NURSE ANESTHETIST, CERTIFIED REGISTERED

## 2024-08-26 PROCEDURE — 3600000005 HC OR TIME - INITIAL BASE CHARGE - PROCEDURE LEVEL FIVE: Performed by: STUDENT IN AN ORGANIZED HEALTH CARE EDUCATION/TRAINING PROGRAM

## 2024-08-26 PROCEDURE — 99100 ANES PT EXTEME AGE<1 YR&>70: CPT | Performed by: ANESTHESIOLOGY

## 2024-08-26 PROCEDURE — 2720000007 HC OR 272 NO HCPCS: Performed by: STUDENT IN AN ORGANIZED HEALTH CARE EDUCATION/TRAINING PROGRAM

## 2024-08-26 PROCEDURE — 72170 X-RAY EXAM OF PELVIS: CPT

## 2024-08-26 PROCEDURE — 0SRR0J9 REPLACEMENT OF RIGHT HIP JOINT, FEMORAL SURFACE WITH SYNTHETIC SUBSTITUTE, CEMENTED, OPEN APPROACH: ICD-10-PCS | Performed by: STUDENT IN AN ORGANIZED HEALTH CARE EDUCATION/TRAINING PROGRAM

## 2024-08-26 PROCEDURE — 2500000004 HC RX 250 GENERAL PHARMACY W/ HCPCS (ALT 636 FOR OP/ED): Performed by: ANESTHESIOLOGIST ASSISTANT

## 2024-08-26 PROCEDURE — 85027 COMPLETE CBC AUTOMATED: CPT

## 2024-08-26 PROCEDURE — 2780000003 HC OR 278 NO HCPCS: Performed by: STUDENT IN AN ORGANIZED HEALTH CARE EDUCATION/TRAINING PROGRAM

## 2024-08-26 PROCEDURE — 86901 BLOOD TYPING SEROLOGIC RH(D): CPT | Performed by: INTERNAL MEDICINE

## 2024-08-26 PROCEDURE — 2500000004 HC RX 250 GENERAL PHARMACY W/ HCPCS (ALT 636 FOR OP/ED)

## 2024-08-26 PROCEDURE — C1776 JOINT DEVICE (IMPLANTABLE): HCPCS | Performed by: STUDENT IN AN ORGANIZED HEALTH CARE EDUCATION/TRAINING PROGRAM

## 2024-08-26 PROCEDURE — 2500000005 HC RX 250 GENERAL PHARMACY W/O HCPCS: Performed by: ANESTHESIOLOGIST ASSISTANT

## 2024-08-26 PROCEDURE — 2500000005 HC RX 250 GENERAL PHARMACY W/O HCPCS: Performed by: NURSE ANESTHETIST, CERTIFIED REGISTERED

## 2024-08-26 PROCEDURE — A27125 PR PARTIAL HIP REPLACEMENT: Performed by: ANESTHESIOLOGY

## 2024-08-26 DEVICE — 132 DEGREE CEMENTED HIP STEM
Type: IMPLANTABLE DEVICE | Site: HIP | Status: FUNCTIONAL
Brand: ACCOLADE

## 2024-08-26 DEVICE — V40 FEMORAL HEAD
Type: IMPLANTABLE DEVICE | Site: HIP | Status: FUNCTIONAL
Brand: V40 HEAD

## 2024-08-26 DEVICE — DISTAL SPACER
Type: IMPLANTABLE DEVICE | Site: HIP | Status: FUNCTIONAL
Brand: ACCOLADE

## 2024-08-26 DEVICE — FULL DOSE BONE CEMENT, 10 PACK CATALOG NUMBER IS 6191-1-010
Type: IMPLANTABLE DEVICE | Site: HIP | Status: FUNCTIONAL
Brand: SIMPLEX

## 2024-08-26 DEVICE — BIPOLAR COMPONENT
Type: IMPLANTABLE DEVICE | Site: HIP | Status: FUNCTIONAL
Brand: UHR

## 2024-08-26 RX ORDER — DIPHENHYDRAMINE HYDROCHLORIDE 50 MG/ML
12.5 INJECTION INTRAMUSCULAR; INTRAVENOUS ONCE AS NEEDED
Status: DISCONTINUED | OUTPATIENT
Start: 2024-08-26 | End: 2024-08-26 | Stop reason: HOSPADM

## 2024-08-26 RX ORDER — OXYCODONE HYDROCHLORIDE 5 MG/1
5 TABLET ORAL EVERY 6 HOURS PRN
Status: DISCONTINUED | OUTPATIENT
Start: 2024-08-26 | End: 2024-08-28 | Stop reason: HOSPADM

## 2024-08-26 RX ORDER — ETOMIDATE 2 MG/ML
INJECTION INTRAVENOUS AS NEEDED
Status: DISCONTINUED | OUTPATIENT
Start: 2024-08-26 | End: 2024-08-26

## 2024-08-26 RX ORDER — LIDOCAINE HCL/PF 100 MG/5ML
SYRINGE (ML) INTRAVENOUS AS NEEDED
Status: DISCONTINUED | OUTPATIENT
Start: 2024-08-26 | End: 2024-08-26

## 2024-08-26 RX ORDER — CYCLOBENZAPRINE HCL 10 MG
10 TABLET ORAL 3 TIMES DAILY PRN
Status: DISCONTINUED | OUTPATIENT
Start: 2024-08-26 | End: 2024-08-28 | Stop reason: HOSPADM

## 2024-08-26 RX ORDER — LEVOTHYROXINE SODIUM 50 UG/1
50 TABLET ORAL DAILY
COMMUNITY
Start: 2010-02-01

## 2024-08-26 RX ORDER — MEPERIDINE HYDROCHLORIDE 25 MG/ML
12.5 INJECTION INTRAMUSCULAR; INTRAVENOUS; SUBCUTANEOUS EVERY 10 MIN PRN
Status: DISCONTINUED | OUTPATIENT
Start: 2024-08-26 | End: 2024-08-26 | Stop reason: HOSPADM

## 2024-08-26 RX ORDER — SODIUM CHLORIDE, SODIUM LACTATE, POTASSIUM CHLORIDE, CALCIUM CHLORIDE 600; 310; 30; 20 MG/100ML; MG/100ML; MG/100ML; MG/100ML
50 INJECTION, SOLUTION INTRAVENOUS CONTINUOUS
Status: ACTIVE | OUTPATIENT
Start: 2024-08-26 | End: 2024-08-27

## 2024-08-26 RX ORDER — SODIUM CHLORIDE 0.9 G/100ML
IRRIGANT IRRIGATION AS NEEDED
Status: DISCONTINUED | OUTPATIENT
Start: 2024-08-26 | End: 2024-08-26 | Stop reason: HOSPADM

## 2024-08-26 RX ORDER — CEFAZOLIN SODIUM 1 G/50ML
SOLUTION INTRAVENOUS
Status: DISCONTINUED
Start: 2024-08-26 | End: 2024-08-26 | Stop reason: WASHOUT

## 2024-08-26 RX ORDER — ROCURONIUM BROMIDE 10 MG/ML
INJECTION, SOLUTION INTRAVENOUS AS NEEDED
Status: DISCONTINUED | OUTPATIENT
Start: 2024-08-26 | End: 2024-08-26

## 2024-08-26 RX ORDER — TRANEXAMIC ACID 10 MG/ML
10 INJECTION, SOLUTION INTRAVENOUS ONCE
Status: COMPLETED | OUTPATIENT
Start: 2024-08-26 | End: 2024-08-26

## 2024-08-26 RX ORDER — MORPHINE SULFATE 2 MG/ML
2 INJECTION, SOLUTION INTRAMUSCULAR; INTRAVENOUS EVERY 5 MIN PRN
Status: DISCONTINUED | OUTPATIENT
Start: 2024-08-26 | End: 2024-08-26 | Stop reason: HOSPADM

## 2024-08-26 RX ORDER — ONDANSETRON HYDROCHLORIDE 2 MG/ML
4 INJECTION, SOLUTION INTRAVENOUS ONCE AS NEEDED
Status: DISCONTINUED | OUTPATIENT
Start: 2024-08-26 | End: 2024-08-26 | Stop reason: HOSPADM

## 2024-08-26 RX ORDER — ACETAMINOPHEN 325 MG/1
650 TABLET ORAL EVERY 4 HOURS PRN
Status: DISCONTINUED | OUTPATIENT
Start: 2024-08-26 | End: 2024-08-26 | Stop reason: HOSPADM

## 2024-08-26 RX ORDER — MORPHINE SULFATE 2 MG/ML
INJECTION, SOLUTION INTRAMUSCULAR; INTRAVENOUS
Status: COMPLETED
Start: 2024-08-26 | End: 2024-08-26

## 2024-08-26 RX ORDER — HYDRALAZINE HYDROCHLORIDE 20 MG/ML
5 INJECTION INTRAMUSCULAR; INTRAVENOUS EVERY 30 MIN PRN
Status: DISCONTINUED | OUTPATIENT
Start: 2024-08-26 | End: 2024-08-26 | Stop reason: HOSPADM

## 2024-08-26 RX ORDER — MIDAZOLAM HYDROCHLORIDE 1 MG/ML
1 INJECTION, SOLUTION INTRAMUSCULAR; INTRAVENOUS ONCE AS NEEDED
Status: DISCONTINUED | OUTPATIENT
Start: 2024-08-26 | End: 2024-08-26 | Stop reason: HOSPADM

## 2024-08-26 RX ORDER — SODIUM CHLORIDE, SODIUM LACTATE, POTASSIUM CHLORIDE, CALCIUM CHLORIDE 600; 310; 30; 20 MG/100ML; MG/100ML; MG/100ML; MG/100ML
100 INJECTION, SOLUTION INTRAVENOUS CONTINUOUS
Status: DISCONTINUED | OUTPATIENT
Start: 2024-08-26 | End: 2024-08-26 | Stop reason: HOSPADM

## 2024-08-26 RX ORDER — ONDANSETRON HYDROCHLORIDE 2 MG/ML
INJECTION, SOLUTION INTRAVENOUS AS NEEDED
Status: DISCONTINUED | OUTPATIENT
Start: 2024-08-26 | End: 2024-08-26

## 2024-08-26 RX ORDER — BUPIVACAINE HYDROCHLORIDE 2.5 MG/ML
INJECTION, SOLUTION INFILTRATION; PERINEURAL AS NEEDED
Status: DISCONTINUED | OUTPATIENT
Start: 2024-08-26 | End: 2024-08-26 | Stop reason: HOSPADM

## 2024-08-26 RX ORDER — MORPHINE SULFATE 2 MG/ML
2 INJECTION, SOLUTION INTRAMUSCULAR; INTRAVENOUS EVERY 2 HOUR PRN
Status: DISCONTINUED | OUTPATIENT
Start: 2024-08-26 | End: 2024-08-28 | Stop reason: HOSPADM

## 2024-08-26 RX ORDER — OXYCODONE HYDROCHLORIDE 5 MG/1
10 TABLET ORAL EVERY 4 HOURS PRN
Status: DISCONTINUED | OUTPATIENT
Start: 2024-08-26 | End: 2024-08-28 | Stop reason: HOSPADM

## 2024-08-26 RX ORDER — CEFAZOLIN SODIUM 2 G/100ML
2 INJECTION, SOLUTION INTRAVENOUS EVERY 8 HOURS
Status: COMPLETED | OUTPATIENT
Start: 2024-08-26 | End: 2024-08-27

## 2024-08-26 RX ORDER — PHENYLEPHRINE HCL IN 0.9% NACL 1 MG/10 ML
SYRINGE (ML) INTRAVENOUS AS NEEDED
Status: DISCONTINUED | OUTPATIENT
Start: 2024-08-26 | End: 2024-08-26

## 2024-08-26 RX ORDER — NALOXONE HYDROCHLORIDE 1 MG/ML
0.2 INJECTION INTRAMUSCULAR; INTRAVENOUS; SUBCUTANEOUS EVERY 5 MIN PRN
Status: DISCONTINUED | OUTPATIENT
Start: 2024-08-26 | End: 2024-08-26 | Stop reason: SDUPTHER

## 2024-08-26 RX ORDER — NALOXONE HYDROCHLORIDE 1 MG/ML
0.2 INJECTION INTRAMUSCULAR; INTRAVENOUS; SUBCUTANEOUS EVERY 5 MIN PRN
Status: DISCONTINUED | OUTPATIENT
Start: 2024-08-26 | End: 2024-08-28 | Stop reason: HOSPADM

## 2024-08-26 RX ORDER — ACETAMINOPHEN 325 MG/1
650 TABLET ORAL EVERY 6 HOURS PRN
COMMUNITY
Start: 2024-07-14

## 2024-08-26 RX ORDER — ACETAMINOPHEN 325 MG/1
650 TABLET ORAL EVERY 4 HOURS PRN
Status: DISCONTINUED | OUTPATIENT
Start: 2024-08-26 | End: 2024-08-28 | Stop reason: HOSPADM

## 2024-08-26 RX ORDER — CEFAZOLIN SODIUM 2 G/100ML
2 INJECTION, SOLUTION INTRAVENOUS
Status: COMPLETED | OUTPATIENT
Start: 2024-08-26 | End: 2024-08-26

## 2024-08-26 RX ORDER — MORPHINE SULFATE 4 MG/ML
4 INJECTION INTRAVENOUS EVERY 5 MIN PRN
Status: DISCONTINUED | OUTPATIENT
Start: 2024-08-26 | End: 2024-08-26 | Stop reason: HOSPADM

## 2024-08-26 RX ORDER — TRANEXAMIC ACID 10 MG/ML
INJECTION, SOLUTION INTRAVENOUS
Status: COMPLETED
Start: 2024-08-26 | End: 2024-08-26

## 2024-08-26 RX ORDER — CEFAZOLIN SODIUM 2 G/100ML
INJECTION, SOLUTION INTRAVENOUS
Status: COMPLETED
Start: 2024-08-26 | End: 2024-08-26

## 2024-08-26 RX ORDER — BISACODYL 5 MG
10 TABLET, DELAYED RELEASE (ENTERIC COATED) ORAL DAILY PRN
Status: DISCONTINUED | OUTPATIENT
Start: 2024-08-26 | End: 2024-08-28 | Stop reason: HOSPADM

## 2024-08-26 RX ORDER — LABETALOL HYDROCHLORIDE 5 MG/ML
5 INJECTION, SOLUTION INTRAVENOUS ONCE AS NEEDED
Status: DISCONTINUED | OUTPATIENT
Start: 2024-08-26 | End: 2024-08-26 | Stop reason: HOSPADM

## 2024-08-26 RX ORDER — FENTANYL CITRATE 50 UG/ML
INJECTION, SOLUTION INTRAMUSCULAR; INTRAVENOUS AS NEEDED
Status: DISCONTINUED | OUTPATIENT
Start: 2024-08-26 | End: 2024-08-26

## 2024-08-26 RX ORDER — ASPIRIN 81 MG/1
81 TABLET ORAL 2 TIMES DAILY
Status: DISCONTINUED | OUTPATIENT
Start: 2024-08-27 | End: 2024-08-28 | Stop reason: HOSPADM

## 2024-08-26 RX ORDER — POLYETHYLENE GLYCOL 3350 17 G/17G
17 POWDER, FOR SOLUTION ORAL DAILY
Status: DISCONTINUED | OUTPATIENT
Start: 2024-08-26 | End: 2024-08-28 | Stop reason: HOSPADM

## 2024-08-26 SDOH — HEALTH STABILITY: MENTAL HEALTH: CURRENT SMOKER: 0

## 2024-08-26 ASSESSMENT — PAIN - FUNCTIONAL ASSESSMENT
PAIN_FUNCTIONAL_ASSESSMENT: 0-10
PAIN_FUNCTIONAL_ASSESSMENT: UNABLE TO SELF-REPORT
PAIN_FUNCTIONAL_ASSESSMENT: 0-10
PAIN_FUNCTIONAL_ASSESSMENT: UNABLE TO SELF-REPORT

## 2024-08-26 ASSESSMENT — COGNITIVE AND FUNCTIONAL STATUS - GENERAL
TURNING FROM BACK TO SIDE WHILE IN FLAT BAD: A LITTLE
PERSONAL GROOMING: A LOT
STANDING UP FROM CHAIR USING ARMS: A LITTLE
MOVING TO AND FROM BED TO CHAIR: A LOT
MOBILITY SCORE: 15
DRESSING REGULAR LOWER BODY CLOTHING: A LOT
DRESSING REGULAR UPPER BODY CLOTHING: A LITTLE
HELP NEEDED FOR BATHING: A LOT
MOVING FROM LYING ON BACK TO SITTING ON SIDE OF FLAT BED WITH BEDRAILS: A LITTLE
EATING MEALS: A LOT
DAILY ACTIVITIY SCORE: 14
WALKING IN HOSPITAL ROOM: A LOT
TOILETING: A LITTLE
CLIMB 3 TO 5 STEPS WITH RAILING: A LOT

## 2024-08-26 ASSESSMENT — PAIN DESCRIPTION - DESCRIPTORS: DESCRIPTORS: OTHER (COMMENT)

## 2024-08-26 ASSESSMENT — PAIN SCALES - GENERAL
PAINLEVEL_OUTOF10: 6
PAINLEVEL_OUTOF10: 0 - NO PAIN

## 2024-08-26 NOTE — NURSING NOTE
-- DO NOT REPLY / DO NOT REPLY ALL --  -- Message is from [employer] ([facility]) --    General Patient Message: Leanna is calling with Edith on behalf of Dr. Shepherd's office. She is requesting that the recent visit notes with Crissy Puri and information for Dr. Monaco is faxed over for Dr. Shepherd to review. She would also like any bloodwork or testing the patient had done as well. That information can be faxed to [phone]. If possible she is requesting that is sent by today, since patient has colonosopy with Dr. Monaco tomorrow. Thank You,      Caller Information       Type Contact Phone/Fax    05/18/2023 10:01 AM CDT Phone (Incoming) Leanna @ Dr. Eliza Shepherd's office (Provider) [phone]        Alternative phone number: NONE     Can a detailed message be left? Yes    Message Turnaround: [place]:    Refer to site's KB page for routing instructions    Please give this turnaround time to the caller:   \"You can expect to receive a response 2-3 business days after your provider's clinical team reviews the message\"               Surgery scheduler called to update surgery time change from 1300 to 1230 as transport arrived to take patient down to surgery. Approx. [phone]. Insufficient time to allow RN to update family member. Surgery notified of family concern.

## 2024-08-26 NOTE — ANESTHESIA PREPROCEDURE EVALUATION
Patient: Della Gordillo    Procedure Information       Date/Time: 08/26/24 1230    Procedure: RIGHT BIPOLAR HEMIARTHROPLASTY (Right: Hip) - Destinee Accolade II, Accolade C cement on back up    Location: [facility] / [facility]    Surgeons: Woody Castillo, DO            Relevant Problems   Anesthesia (within normal limits)       Clinical information reviewed:   Tobacco  Allergies  Meds   Med Hx  Surg Hx   Fam Hx  Soc Hx        NPO Detail:  NPO/Void Status  Date of Last Liquid: 08/25/24  Time of Last Liquid: 1800  Date of Last Solid: 08/25/24  Time of Last Solid: 1700  Last Intake Type: Food  Time of Last Void: 1140         Physical Exam    Airway  Mallampati: unable to assess     Cardiovascular - normal exam  Rhythm: regular  Rate: normal     Dental - normal exam     Pulmonary - normal exam     Abdominal            Anesthesia Plan    History of general anesthesia?: yes  History of complications of general anesthesia?: no    ASA 3     general     The patient is not a current smoker.    intravenous induction   Postoperative administration of opioids is intended.  Trial extubation is planned.  Anesthetic plan and risks discussed with patient and healthcare power of .  Use of blood products discussed with patient and healthcare power of  who consented to blood products.    Plan discussed with [facility].

## 2024-08-26 NOTE — ASSESSMENT & PLAN NOTE
Pedro Junior MD  Physician  Internal Medicine     H&P      Addendum     Date of Service: 8/25/2024  9:51 AM     Addendum       Expand All Collapse All    History Of Present Illness  Della Gordillo is a 89 y.o. female with a past medical history of DDD (cervical), hypercholesterolemia, hyperlipidemia, hypothyroidism, osteoporosis, and recent L1 compression fracture who presents to the emergency department today by ambulance from the [facility] for a fall.  Per EMS, they were called to the [facility] this morning after patient was found on the floor out of bed. Upon my arrival, I have just woken patient out of sleep, so she is mildly confused.  She asked me where the dog is.  I believe she was just dreaming, because when I asked her her name, where she was, and what month it was, she answered all 3 questions correctly.  She is hard of hearing.  She tells me this morning that she fell out of bed, but does not know how.  She denies feeling dizzy or passing out before hand.  She does state that she hit her head, but denies LOC and use of blood thinners.  She does endorse a mild cough, but states she is not coughing anything up.  She denies fever/chills, chest pain, shortness of breath, palpitations, leg swelling, nausea/vomiting, diarrhea, urinary symptoms, numbness/tingling.  She states that she takes her medications daily, and that the staff at the [facility] watches her take them.  She states she is eating and drinking appropriately.  She states she uses a wheelchair to get around, but it is kept in a corner of her room that she cannot get to.  I informed her that she has broken her hip, and patient states she does not want any surgery. Patient has DNRCC form in her nursing home paperwork.     ED course: On arrival, patient's blood pressure 176/117, heart rate 79, respirations 17, afebrile, saturating 96% on room air.  Patient now saturating 91% on room air, BP now 176/92.  Labs and imaging performed, revealing  potassium 2.9, initial and delta troponin 17, no leukocytosis or anemia.  CT imaging shows pseudosubluxation of C2 on C3 and C7 on T1.  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  No acute intracranial abnormality.  X-ray of right femur shows acute fracture of the neck of the right femur with foreshortening.  EKG, per ED physician, shows normal sinus rate and rhythm with frequent PVCs.  No STEMI.  Patient given potassium replacement and morphine in the ED.  Patient to be admitted inpatient under Dr. Junior.     Past Medical History  Medical History        Past Medical History:   Diagnosis Date    H/O degenerative disc disease      HLD (hyperlipidemia)      Hypothyroidism      Osteoporosis              Surgical History  Surgical History   History reviewed. No pertinent surgical history.        Social History  She has no history on file for tobacco use, alcohol use, and drug use.     Family History  Family History   No family history on file.        Allergies  Patient has no known allergies.     Review of Systems     Physical Exam  Constitutional:       General: She is not in acute distress.     Appearance: She is not ill-appearing or toxic-appearing.      Comments: Patient mildly confused when I arrive for my interview. I have just woken her out of sleep, she asks me where the dog is. Once she wakes up more, she is able to tell me her name, where she is, and what month it is.    HENT:      Head: Normocephalic and atraumatic.      Nose: Nose normal.      Mouth/Throat:      Mouth: Mucous membranes are dry.      Pharynx: Oropharynx is clear.   Eyes:      Extraocular Movements: Extraocular movements intact.      Conjunctiva/sclera: Conjunctivae normal.      Pupils: Pupils are equal, round, and reactive to light.   Cardiovascular:      Rate and Rhythm: Normal rate and regular rhythm.      Pulses: Normal pulses.   Pulmonary:      Effort: Pulmonary effort is normal.      Breath sounds: Normal  "breath sounds.   Abdominal:      General: Abdomen is flat. Bowel sounds are normal.      Palpations: Abdomen is soft.      Tenderness: There is no abdominal tenderness.   Musculoskeletal:         General: Tenderness (R hip TTP. Patient tells me not to move it.) present.      Cervical back: Tenderness present.      Right lower leg: No edema.      Left lower leg: No edema.   Neurological:      General: No focal deficit present.      Mental Status: She is oriented to person, place, and time.      Sensory: No sensory deficit.      Comments: Neurovascular status intact of BLE.  Strength and sensation intact bilaterally.   Psychiatric:         Mood and Affect: Mood normal.         Behavior: Behavior normal.         Thought Content: Thought content normal.            Last Recorded Vitals  Blood pressure (!) 176/92, pulse 71, temperature 36.4 °C (97.5 °F), resp. rate 16, height 1.676 m (5' 6\"), weight 68 kg (150 lb), SpO2 (!) 91%.     Relevant Results     Scheduled medications    Scheduled Medications   potassium chloride, 20 mEq, oral, Once  potassium chloride, 20 mEq, intravenous, q2h         Continuous medications  Continuous Medications         PRN medications  PRN Medications   PRN medications: ketorolac, polyethylene glycol              Results for orders placed or performed during the hospital encounter of 08/25/24 (from the past 24 hour(s))   CBC and Auto Differential   Result Value Ref Range     WBC 8.7 4.4 - 11.3 x10*3/uL     nRBC 0.0 0.0 - 0.0 /100 WBCs     RBC 4.49 4.00 - 5.20 x10*6/uL     Hemoglobin 13.7 12.0 - 16.0 g/dL     Hematocrit 40.3 36.0 - 46.0 %     MCV 90 80 - 100 fL     MCH 30.5 26.0 - 34.0 pg     MCHC 34.0 32.0 - 36.0 g/dL     RDW 12.8 11.5 - 14.5 %     Platelets 321 150 - 450 x10*3/uL     Neutrophils % 61.4 40.0 - 80.0 %     Immature Granulocytes %, Automated 0.8 0.0 - 0.9 %     Lymphocytes % 25.3 13.0 - 44.0 %     Monocytes % 9.4 2.0 - 10.0 %     Eosinophils % 2.8 0.0 - 6.0 %     Basophils % 0.3 " 0.0 - 2.0 %     Neutrophils Absolute 5.33 1.60 - 5.50 x10*3/uL     Immature Granulocytes Absolute, Automated 0.07 0.00 - 0.50 x10*3/uL     Lymphocytes Absolute 2.20 0.80 - 3.00 x10*3/uL     Monocytes Absolute 0.82 (H) 0.05 - 0.80 x10*3/uL     Eosinophils Absolute 0.24 0.00 - 0.40 x10*3/uL     Basophils Absolute 0.03 0.00 - 0.10 x10*3/uL   Comprehensive metabolic panel   Result Value Ref Range     Glucose 157 (H) 74 - 99 mg/dL     Sodium 140 136 - 145 mmol/L     Potassium 2.9 (LL) 3.5 - 5.3 mmol/L     Chloride 103 98 - 107 mmol/L     Bicarbonate 23 21 - 32 mmol/L     Anion Gap 17 10 - 20 mmol/L     Urea Nitrogen 7 6 - 23 mg/dL     Creatinine 0.63 0.50 - 1.05 mg/dL     eGFR 81 >60 mL/min/1.73m*2     Calcium 9.1 8.6 - 10.3 mg/dL     Albumin 3.9 3.4 - 5.0 g/dL     Alkaline Phosphatase 119 33 - 136 U/L     Total Protein 7.1 6.4 - 8.2 g/dL     AST 18 9 - 39 U/L     Bilirubin, Total 1.3 (H) 0.0 - 1.2 mg/dL     ALT 4 (L) 7 - 45 U/L   Magnesium   Result Value Ref Range     Magnesium 1.70 1.60 - 2.40 mg/dL   Troponin I, High Sensitivity   Result Value Ref Range     Troponin I, High Sensitivity 17 (H) 0 - 13 ng/L   Troponin I, High Sensitivity   Result Value Ref Range     Troponin I, High Sensitivity 17 (H) 0 - 13 ng/L      XR pelvis 1-2 views     Result Date: 8/25/2024  Interpreted By:  Amada Chang, STUDY: XR PELVIS 1-2 VIEWS; ;  8/25/2024 8:12 am   INDICATION: Signs/Symptoms:right hip/upper femur pain.   COMPARISON: None.   ACCESSION NUMBER(S): [id number]   ORDERING CLINICIAN: ALLISON NEIL   FINDINGS: AP view of the pelvis shows an acute right femoral neck fracture with foreshortening. The remaining bones and joints appear intact        Acute displaced right femoral neck fracture     MACRO: None   Signed by: Amada Chang 8/25/2024 8:29 AM Dictation workstation:   [id number]     XR femur right 2+ views     Result Date: 8/25/2024  Interpreted By:  Amada Chang, STUDY: XR FEMUR RIGHT 2+ VIEWS; ;  8/25/2024 8:12 am    INDICATION: Signs/Symptoms:fall/tender proximal.   COMPARISON: None.   ACCESSION NUMBER(S): [id number]   ORDERING CLINICIAN: CHIKA MARTINEZ   FINDINGS: Two views right femur   There is a fracture through the right femoral neck with foreshortening. Degenerative changes are seen on the greater trochanter. Sclerosis of the distal right femur probably represents a bone infarct.        Acute fracture of the neck of the right femur with foreshortening     MACRO: None   Signed by: Amada Chang 8/25/2024 8:28 AM Dictation workstation:   [id number]     XR chest 1 view     Result Date: 8/25/2024  Interpreted By:  Amada Chang, STUDY: XR CHEST 1 VIEW 8/25/2024 8:12 am   INDICATION: Signs/Symptoms:fall   COMPARISON: None   ACCESSION NUMBER(S): [id number]   ORDERING CLINICIAN: CHIKA MARTINEZ   TECHNIQUE: AP view   FINDINGS: Is enlargement of the cardiac silhouette. Pulmonary vascularity is normal. No infiltrates. No pneumothorax or pleural effusion. Healed right rib fractures are noted        No acute abnormalities   Signed by: Amada Chang 8/25/2024 8:27 AM Dictation workstation:   [id number]     CT cervical spine wo IV contrast     Result Date: 8/25/2024  Interpreted By:  Amada Chang, STUDY: CT CERVICAL SPINE WO IV CONTRAST;  8/25/2024 7:00 am   INDICATION: Signs/Symptoms:fall.   COMPARISON: None.   ACCESSION NUMBER(S): [id number]   ORDERING CLINICIAN: CHIKA MARTINEZ   TECHNIQUE: Axial CT images of the cervical spine are obtained. Axial, coronal and sagittal reconstructions are provided for review.   All CT exams are performed with 1 or more of the following dose reduction techniques: Automatic exposure control, adjustment of mA and/or kv according to patient size, or use of iterative reconstruction techniques.   FINDINGS: There is straightening of the cervical lordosis. Multilevel disc space narrowing and spurring noted with facet and uncovertebral joint hypertrophy. Arthritic pseudo subluxation of C2 on C3 and C7  on T1 noted bony encroachment on neural foramina due to uncovertebral joint hypertrophy most severe involving the right C6-7 foramen.   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 Aortic arch has short axis measurement of 3.3 cm          1. No acute fracture or traumatic subluxation 2. Multilevel cervical spondylosis   MACRO: None   Signed by: Amada Chang 8/25/2024 8:10 AM Dictation workstation:   [id number]     CT thoracic spine wo IV contrast     Result Date: 8/25/2024  Interpreted By:  Amada Chang, STUDY: CT THORACIC SPINE WO IV CONTRAST;  8/25/2024 7:00 am   INDICATION: Signs/Symptoms:fall.   COMPARISON: None.   ACCESSION NUMBER(S): [id number]   ORDERING CLINICIAN: CHIKA MARTINEZ   TECHNIQUE: Axial CT images of the thoracic spine are obtained. Axial, coronal and sagittal reconstructions are submitted for review.   FINDINGS: There is an acute compression fracture of the superior endplate of L1 with minimal retropulsion of the posterosuperior endplate.   The remaining thoracic vertebral body heights are maintained. Alignment is satisfactory. Disc space narrowing and spurring is seen throughout the thoracic spine. No bony canal stenosis or bony foraminal stenosis noted.   Incidental note is made of an aberrant right subclavian artery. Ectasia of the ascending thoracic aorta measures 3.9 cm short axis. Mild cardiomegaly with mild atherosclerotic coronary artery calcifications. Coarsened interstitial markings in the lung bases probably due to chronic change mild scoliosis noted.          1. Diffuse thoracic spondylosis and mild scoliosis. No acute bony abnormalities in the thoracic spine. 2. Acute compression fracture of L1     MACRO: None   Signed by: Amada Chang 8/25/2024 8:04 AM Dictation workstation:   [id number]     CT lumbar spine wo IV contrast     Result Date: 8/25/2024  Interpreted By:  Amada Chang, STUDY: CT LUMBAR SPINE WO IV CONTRAST  8/25/2024 7:00 am   INDICATION:  Signs/Symptoms:fall, history of recent lumbar fracture   COMPARISON: None.   ACCESSION NUMBER(S): [id number]   ORDERING CLINICIAN: CHIKA MARTINEZ   TECHNIQUE: Axial CT images of the lumbar spine are obtained. Axial, coronal and sagittal reconstructions are provided for review.   All CT exams are performed with 1 or more of the following dose reduction techniques: Automatic exposure control, adjustment of mA and/or kv according to patient size, or use of iterative reconstruction techniques.   FINDINGS: T12-L1: There is an acute compression fracture of the superior endplate of L1 with 2 mm of retropulsion of the posterosuperior endplate. Minimal bony canal stenosis. No foraminal stenosis.   L1-2: There is a chronic appearing compression fracture of the superior endplate of L2. There is a broad-based disc bulge. There is no bony canal or bony foraminal stenosis. Schmorl's node also noted in the superior endplate of L2.   L2-3: Small posterior osteophyte disc complex effaces the ventral thecal sac. Posterior element hypertrophy is noted. There is lumbar canal stenosis. No bony foraminal stenosis.   L3-4: There is 2 mm of anterior subluxation of L3 on L4 with a broad-based disc bulge and posterior element hypertrophy. Canal stenosis due to degenerative changes. No bony foraminal stenosis   L4-5: Slight disc space narrowing with broad-based disc bulge and posterior element hypertrophy. Canal stenosis noted due to degenerative changes.   L5-S1: Broad-based disc bulge. No bony canal or bony foraminal stenosis.   Additional findings include hyperdense material layering in the gallbladder and colonic diverticulosis.        1. Acute compression fracture of L1 with 2 mm of retropulsion of the posterosuperior endplate causing minimal bony canal stenosis. 2. Remote appearing L2 compression fracture 3. Multilevel lumbar spondylosis   MACRO: None   Signed by: Amada Chang 8/25/2024 8:00 AM Dictation workstation:   [id number]      CT head wo IV contrast     Result Date: 8/25/2024  Interpreted By:  Amada Chang, STUDY: CT HEAD WO IV CONTRAST;  8/25/2024 7:00 am   INDICATION: Signs/Symptoms:fall.   COMPARISON: None..   ACCESSION NUMBER(S): [id number]   ORDERING CLINICIAN: CHIKA MARTINEZ   TECHNIQUE: CT axial images through the Brain were obtained without contrast.   All CT exams are performed with 1 or more of the following dose reduction techniques: Automatic exposure control, adjustment of mA and/or kv according to patient size, or use of iterative reconstruction techniques.   FINDINGS: There is mass effect or acute intracranial hemorrhage. Tiny remote left basal ganglia lacunar infarct. Ventricles are enlarged likely related to diffuse cerebral atrophy. Gray-white differentiation is maintained.   Paranasal sinus mucosal thickening noted.. Lens replacements are seen.        No acute intracranial abnormality.   MACRO: None.   Signed by: Amada Chang 8/25/2024 7:54 AM Dictation workstation:   [id number]            Assessment/Plan        Assessment & Plan  Fall, initial encounter  Acute displaced right femoral neck fracture  Mechanical fall  History of recent L1 compression fracture  Osteoporosis  -Orthopedic surgery consult and recommendations appreciated  -Imaging results as above  -N.p.o. for possible surgical intervention  -Pain control  -Urinalysis ordered  -PT/OT, fall precautions     Elevated high-sensitivity troponins  Hyperlipidemia  Hypertension  -Initial troponin 17, delta troponin 17.  Third ordered, trend  -EKG, per ED physician, shows normal sinus rate and rhythm with frequent PVCs.  No STEMI     Hypokalemia  -Potassium 2.9 on arrival, replacement ordered in the ED  -Monitor with BMP in the a.m.     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 for possible surgical intervention, continue home medications when able     DVT prophylaxis  -No  chemical anticoagulation due to possible surgical intervention  -SCDs     Patient fully evaluated on August 25.  Critical correct electrolyte disturbances before surgical intervention.     I spent 45 minutes in the professional and overall care of this patient.                      Revision History

## 2024-08-26 NOTE — PROGRESS NOTES
"Della Gordillo is a 89 y.o. female on day 1 of admission presenting with Fall, initial encounter.    Subjective   Seen and examined at bedside, resting comfortably.  Patient responds to motion of right hip.         Objective     Physical Exam  RLE:  - TTP over right hip  - No wounds or lacerations right hip  - +log roll, heel strike  - Pain with attempted ROM right hip  - Extremity is warm and well perfused with BCR    Last Recorded Vitals  Blood pressure 119/69, pulse 71, temperature 36.7 °C (98.1 °F), temperature source Temporal, resp. rate 20, height 1.676 m (5' 6\"), weight 68 kg (150 lb), SpO2 93%.  Intake/Output last 3 Shifts:  I/O last 3 completed shifts:  In: 210 (3.1 mL/kg) [P.O.:10; IV Piggyback:200]  Out: 600 (8.8 mL/kg) [Urine:600 (0.2 mL/kg/hr)]  Weight: 68 kg     Relevant Results  Results for orders placed or performed during the hospital encounter of 08/25/24 (from the past 24 hour(s))   Troponin I, High Sensitivity   Result Value Ref Range    Troponin I, High Sensitivity 20 (H) 0 - 13 ng/L   Urinalysis with Reflex Culture and Microscopic   Result Value Ref Range    Color, Urine Colorless (N) Light-Yellow, Yellow, Dark-Yellow    Appearance, Urine Clear Clear    Specific Gravity, Urine 1.011 1.005 - 1.035    pH, Urine 6.5 5.0, 5.5, 6.0, 6.5, 7.0, 7.5, 8.0    Protein, Urine NEGATIVE NEGATIVE, 10 (TRACE), 20 (TRACE) mg/dL    Glucose, Urine Normal Normal mg/dL    Blood, Urine NEGATIVE NEGATIVE    Ketones, Urine TRACE (A) NEGATIVE mg/dL    Bilirubin, Urine NEGATIVE NEGATIVE    Urobilinogen, Urine Normal Normal mg/dL    Nitrite, Urine NEGATIVE NEGATIVE    Leukocyte Esterase, Urine 75 Ethan/µL (A) NEGATIVE   Extra Urine Gray Tube   Result Value Ref Range    Extra Tube Hold for add-ons.    Microscopic Only, Urine   Result Value Ref Range    WBC, Urine 1-5 1-5, NONE /HPF    RBC, Urine 1-2 NONE, 1-2, 3-5 /HPF   CBC   Result Value Ref Range    WBC 8.9 4.4 - 11.3 x10*3/uL    nRBC 0.0 0.0 - 0.0 /100 WBCs    RBC 4.18 " 4.00 - 5.20 x10*6/uL    Hemoglobin 12.7 12.0 - 16.0 g/dL    Hematocrit 37.2 36.0 - 46.0 %    MCV 89 80 - 100 fL    MCH 30.4 26.0 - 34.0 pg    MCHC 34.1 32.0 - 36.0 g/dL    RDW 13.2 11.5 - 14.5 %    Platelets 317 150 - 450 x10*3/uL   Comprehensive Metabolic Panel   Result Value Ref Range    Glucose 136 (H) 74 - 99 mg/dL    Sodium 137 136 - 145 mmol/L    Potassium 3.5 3.5 - 5.3 mmol/L    Chloride 102 98 - 107 mmol/L    Bicarbonate 24 21 - 32 mmol/L    Anion Gap 15 10 - 20 mmol/L    Urea Nitrogen 10 6 - 23 mg/dL    Creatinine 0.65 0.50 - 1.05 mg/dL    eGFR 80 >60 mL/min/1.73m*2    Calcium 8.8 8.6 - 10.3 mg/dL    Albumin 3.5 3.4 - 5.0 g/dL    Alkaline Phosphatase 97 33 - 136 U/L    Total Protein 6.5 6.4 - 8.2 g/dL    AST 14 9 - 39 U/L    Bilirubin, Total 2.0 (H) 0.0 - 1.2 mg/dL    ALT 4 (L) 7 - 45 U/L     XR femur right 2+ views 08/25/2024    Narrative  Interpreted By:  Amada Chang,  STUDY:  XR FEMUR RIGHT 2+ VIEWS; ;  8/25/2024 8:12 am    INDICATION:  Signs/Symptoms:fall/tender proximal.    COMPARISON:  None.    ACCESSION NUMBER(S):  [id number]    ORDERING CLINICIAN:  CHIKA MARTINEZ    FINDINGS:  Two views right femur    There is a fracture through the right femoral neck with  foreshortening. Degenerative changes are seen on the greater  trochanter. Sclerosis of the distal right femur probably represents a  bone infarct.    Impression  Acute fracture of the neck of the right femur with foreshortening      MACRO:  None    Signed by: Amada Chang 8/25/2024 8:28 AM  Dictation workstation:   [id number]                 Assessment/Plan   Assessment & Plan  Fall, initial encounter    Closed fracture of neck of right femur (Multi)    Right displaced femoral neck fracture    Had a discussion with the patient's daughter Della Christian over the phone this morning who previously spoke to Dr. Israel yesterday regarding possible surgical intervention for this displaced right femoral neck fracture.  She has spoken with her family  and they do wish to proceed with surgical intervention for pain control purposes.  She does understand the risks and complications including but not limited to infection, DVT, PE, continued pain, need for later revision operation, blood loss, damage to surrounding structures.  We will plan on proceeding with right hip hemiarthroplasty later today.  Please keep patient NPO.  Medical optimization per primary team.       I spent 30 minutes in the professional and overall care of this patient.      Woody Castillo, DO

## 2024-08-26 NOTE — PROGRESS NOTES
Attempted to meet with the patient earlier, noted that she is in OR for a procedure. Patient is from the [facility] nursing [facility], will follow with family pending progress.

## 2024-08-26 NOTE — OP NOTE
RIGHT BIPOLAR HEMIARTHROPLASTY (R) Operative Note     Date: 2024 - 2024  OR Location: [facility]    Name: Della Gordillo, : 1926, Age: 89 y.o., MRN: [id number], Sex: female    Diagnosis  Pre-op Diagnosis      * Closed fracture of neck of right femur, initial encounter (Multi) [S72.001A] Post-op Diagnosis     * Closed fracture of neck of right femur, initial encounter (Multi) [S72.001A]     Procedures  RIGHT BIPOLAR HEMIARTHROPLASTY  22716 - TN HEMIARTHROPLASTY HIP PARTIAL      Surgeons      * Woody Castillo - Primary    Resident/Fellow/Other Assistant:  Surgeons and Role:  * No surgeons found with a matching role *    Procedure Summary  Anesthesia: General  ASA: III  Anesthesia Staff: Anesthesiologist: Rafael Vidales MD  CRNA: FRITZ Cardenas-CRNA  C-AA: SYLVAIN Barry  Estimated Blood Loss: 150mL  Intra-op Medications: * Intraprocedure medication information is unavailable because the case start and end events have not been set *           Anesthesia Record               Intraprocedure I/O Totals          Intake    Tranexamic Acid 0.00 mL    The total shown is the total volume documented since Anesthesia Start was filed.    ceFAZolin (Ancef) 2 g in dextrose (iso)  mL 100.00 mL    Total Intake 100 mL       Output    Est. Blood Loss 50 mL    Total Output 50 mL       Net    Net Volume 50 mL          Specimen:   ID Type Source Tests Collected by Time   1 : RIGHT HIP BONE AND TISSUE Tissue HIP ARTHROPLASTY RIGHT SURGICAL PATHOLOGY EXAM Woody Castillo DO 2024 1417        Staff:   Circulator: Blayne  Circulator: Noris  Scririna Person: Tamara         Drains and/or Catheters: * None in log *    Tourniquet Times:         Implants:  Implants       Type Name Action Serial No.      Implant CEMENT, BONE, SIMPLEX P, RADIOPAQUE, FULL DOSE, 40 GM - [id number] Implanted      Implant CEMENT, BONE, SIMPLEX P, RADIOPAQUE, FULL DOSE, 40 GM - [id number] Implanted      Joint SPACER, DISTAL 12MM MED ACCOLADE  - [id number] Implanted      Joint STEM, FEMUR 132D SZ 4 137MM ACCOLADE C - [id number] Implanted      Joint HEAD, FEMUR V40 26/0 COCR - [id number] Implanted      Joint BIPOLAR, UHR, 26 X 45MM - [id number] Implanted               Findings: Right displaced subcapital femoral neck fracture    Indications: Della Gordillo is an 89 y.o. female who is having surgery for Closed fracture of neck of right femur, initial encounter (Multi) [S72.001A].     The patient was seen in the preoperative area. The risks, benefits, complications, treatment options, non-operative alternatives, expected recovery and outcomes were discussed with the patient. The possibilities of reaction to medication, pulmonary aspiration, injury to surrounding structures, bleeding, recurrent infection, the need for additional procedures, failure to diagnose a condition, and creating a complication requiring transfusion or operation were discussed with the patient. The patient concurred with the proposed plan, giving informed consent.  The site of surgery was properly noted/marked if necessary per policy. The patient has been actively warmed in preoperative area. Preoperative antibiotics have been ordered and given within 1 hours of incision. Venous thrombosis prophylaxis have been ordered including bilateral sequential compression devices and chemical prophylaxis    Procedure Details:   The operative procedure was confirmed with the patient and the operative site was marked.  The patient was brought to the operating room and transferred to the operating table. Anesthesia was administered by the anesthesiology team.  The patient was placed into the lateral decubitus position with the operative extremity facing up, and was secured utilizing a pegboard.  An axillary roll was placed, and care was taken to ensure that all bony prominences and nerves were well-padded and free of compression. Prophylactic preoperative antibiotics were given.   The right lower  extremity was prepped and draped in the usual sterile fashion.  A time-out was undertaken by the entire team and the procedure was confirmed.     A direct lateral approach to the hip was performed with an incision centered slightly anterior over the greater trochanter.  Subcutaneous dissection was made down to the IT band and tensor fascia vinh.  The fascia was incised in line with the incision.  Anterior and posterior borders of the gluteus medius was identified and a raphae was made in line with the fibers.  Retractors were placed and the gluteus minimus was also identified.  The raphae was extended using cautery down to the greater trochanter and a sleeve of tissue was taken down to and into the vastus lateralis.  The lower extremity was externally rotated and periosteal elevation of the tissues was performed off the greater trochanter.  The capsule was then identified and capsulotomy was performed in line with the femoral neck tagging both the anterior and posterior aspects of the capsule.  At this point the femoral neck fracture was identified.  The left lower extremity was fully externally rotated and placed sterilely into the drape bag.  Cobras were placed inferior and superiorly on the femoral neck.  Femoral neck cut was measured and performed.  The leg was then brought back on the operative table in a sterile fashion.  The femur was retracted posteriorly and a corkscrew was used to extract the femoral head out of the acetabulum.  The femoral head was measured and appropriate femoral head sizing was performed.  Next the leg was placed back in sterile drape bag and externally rotated.  Retractors were placed and sequential broaching was performed.  Based on the patient's age and bone quality, decision was made to use a cemented implant.  Final trial was impacted into place and the trial head was placed.  The hip was reduced and found to be stable to range of motion and leg lengths approximately equal.  The  hip was then dislocated and all trial components were removed.  We then prepared the canal in standard fashion for cementing.  The canal was irrigated extensively and a cement restrictor was placed.  The canal was then irrigated again with normal saline and packed with Ray-Nancy sponges.  2 batches of polymethylmethacrylate cement were then mixed on the back table, Ray-Nancy's were removed and the canal was backfilled with cement in standard fashion.  The final cemented femoral component was then gently impacted into position, taking care to maintain appropriate version and lateralization of the component.  All excess cement was then removed with curettes.  The cement was then allowed time to cure.  Trial head was then reapplied and the hip was again reduced.  Leg lengths were found to be equal and stability was found to be excellent.  The hip was dislocated and trial component was removed.  The stem was then cleansed and final bipolar head component was impacted into place. The hip was reduced and final assessment revealed a stable construct.  The wound was copiously irrigated using sterile saline and Irricept.  The capsule was reapproximated using nonabsorbable suture.  The anterior periosteal sleeve of tissue and abductors were meticulously repaired.  The IT band was closed using #1 Vicryl suture followed by a running #1 Stratafix.  2-0 Vicryl was used to close the subcutaneous tissue.  Skin was closed using staples.  A sterile dressing was applied.  Patient was then awoken from anesthesia and transported to PACU in stable condition.  All counts were correct.  Case was clean.  No complications were encountered throughout the procedure    The case was done under antibiotic prophylaxis with the operative team double-gloved.  There were no breaks in sterile technique.  The wound was irrigated repeatedly throughout the procedure with sterile saline.      Complications:  None; patient tolerated the procedure well.     Disposition: PACU - hemodynamically stable.  Condition: stable       Additional Details: None    Attending Attestation: I was present and scrubbed for the entire procedure.      Plan:   - Weight Bearing Status: WBAT RLE, anterior hip precautions  - DVT Prophylaxis: ASA 81mg BID x 28 days  - Continue 24h prophylactic antibiotics  - Mepilex dressing x 7 days  - Pain control  - CM planning discharge.  Follow up in 2 weeks outpatient for repeat radiographs and staple removal.     Woody Castillo,   Orthopaedic Surgery  Sports Medicine & Shoulder  [facility]   [phone]

## 2024-08-26 NOTE — CARE PLAN
The patient's goals for the shift include      The clinical goals for the shift include HDS/Pain management      Problem: Skin  Goal: Decreased wound size/increased tissue granulation at next dressing change  Outcome: Progressing  Flowsheets (Taken 8/26/2024 0722)  Decreased wound size/increased tissue granulation at next dressing change: Protective dressings over bony prominences  Goal: Participates in plan/prevention/treatment measures  Outcome: Progressing  Flowsheets (Taken 8/26/2024 0722)  Participates in plan/prevention/treatment measures: Discuss with provider PT/OT consult  Goal: Prevent/manage excess moisture  Outcome: Progressing  Flowsheets (Taken 8/26/2024 0722)  Prevent/manage excess moisture: Moisturize dry skin  Goal: Prevent/minimize sheer/friction injuries  Outcome: Progressing  Flowsheets (Taken 8/26/2024 0722)  Prevent/minimize sheer/friction injuries: HOB 30 degrees or less  Goal: Promote/optimize nutrition  Outcome: Progressing  Flowsheets (Taken 8/26/2024 0722)  Promote/optimize nutrition: Offer water/supplements/favorite foods  Goal: Promote skin healing  Outcome: Progressing  Flowsheets (Taken 8/26/2024 0722)  Promote skin healing: Rotate device position/do not position patient on device     Problem: Fall/Injury  Goal: Not fall by end of shift  Outcome: Progressing  Goal: Be free from injury by end of the shift  Outcome: Progressing  Goal: Verbalize understanding of personal risk factors for fall in the hospital  Outcome: Progressing  Goal: Verbalize understanding of risk factor reduction measures to prevent injury from fall in the home  Outcome: Progressing  Goal: Use assistive devices by end of the shift  Outcome: Progressing  Goal: Pace activities to prevent fatigue by end of the shift  Outcome: Progressing

## 2024-08-26 NOTE — ANESTHESIA PROCEDURE NOTES
Airway  Date/Time: 8/26/2024 12:54 PM  Urgency: elective    Airway not difficult    Staffing  Performed: attending   Authorized by: Rafael Vidales MD    Performed by: SYLVAIN Barry  Patient location during procedure: OR    Indications and Patient Condition  Indications for airway management: anesthesia and airway protection  Spontaneous ventilation: present  Sedation level: deep  Preoxygenated: yes  MILS maintained throughout  Mask difficulty assessment: 0 - not attempted    Final Airway Details  Final airway type: endotracheal airway      Successful airway: ETT  Cuffed: yes   Successful intubation technique: direct laryngoscopy  Facilitating devices/methods: cricoid pressure  Endotracheal tube insertion site: oral  Blade: Norma  Blade size: #4  ETT size (mm): 7.0  Cormack-Lehane Classification: grade I - full view of glottis  Placement verified by: chest auscultation and capnometry   Measured from: teeth  ETT to teeth (cm): 19  Number of attempts at approach: 1  Number of other approaches attempted: 0           APER

## 2024-08-26 NOTE — DISCHARGE INSTRUCTIONS
Hip Fracture Surgery - Post-operative instructions    Activity and Weightbearing Status  You will need to use crutches or a walker, but you are allowed full weight bearing on your operative hip. Let pain be a guide, keeping in mind that you just had surgery.     Hip precautions:  Anterior Lateral Hip Precautions:  - The provocative position for hip dislocation is: hip extension, external rotation.   - Do not step backwards with surgical leg. No hip extension.  - Do not allow surgical leg to externally rotate (turn outwards).  - Do not cross your legs. Use a pillow between legs when rolling.  - Sleep on your surgical side when side lying.    Assist devices   You will be discharged from the hospital with a walker, crutches or a cane depending on how well you walk with physical therapy as an inpatient. You will typically use these aids anywhere from a few days to a few weeks and stop using them when you are stable and strong on your feet. Some people who have used these devices for years may require prolonged use for reasons unrelated to the surgery.     Dressing Changes  Dressings must be kept clean.  You will be sent home with a special dressing over you incision.  This must stay in place for 7 days.  To remove after 7 days pull down on a corner in line with the dressing to break the adhesive and then remove normally.  You may leave open to air once this is removed.  If this area is sensitive, you may cover with an a small dressing of 4x4s and tape/tegaderm. You may not take a bath or go swimming until the incision is completely healed. If you had staples placed and you are still at skilled nursing facility 3 weeks after surgery, they can be removed at that time.     Swelling and bruising   After surgery swelling and bruising of the operative site is normal and will gradually decrease as the days pass. If activity and exercise worsens your swelling, take time to lie down and apply ice over the area of  swelling.    Blood clot prevention  You will be prescribed medication to lower your risk of forming blood clots. This medication is important to take until the prescription is finished. In addition, being active and performing your exercises properly can minimize your risk  Aspirin 81 mg twice daily for 4 weeks after surgery     Diet   Typically, with adequate protein intake for promotion of healing, there are no special diet restrictions. Make sure you eat a well-balanced meal, drink plenty of fluids and incorporate fiber into your diet as oral pain medications have a tendency to cause constipation. It is also a good idea to take a stool softener such as Colace or Citrucal daily until your system becomes regular after surgery.    Medication - Relieving pain   It is normal to have some pain after surgery. Pain medications have been prescribed and enough pain pills have been given to cover you beyond your next office visit. It should be noted that pain medications take about one-half hour to start working, so take them prior to the pain becoming severe. DO NOT drink alcohol while taking prescribed pain medication. It is dangerous and illegal to drive while taking pain medicine. If you need a refill on pain medication before your first scheduled appointment, please call our office during regular office hours. These refills cannot be called into a pharmacy so they cannot be filled after hours or over the weekend. The office needs advanced notice to accommodate these requests.     Activity  After your hip surgery it is of utmost importance you follow the exercises given from your physical therapist. These will help you progress more quickly. The more active you are the less likely you are to get a blood clot. Let pain be your guide to your activity level.       Call the office if you notice any of the following:   Fever above 101° Fahrenheit   Persistent swelling, redness, or uncontrolled pain in the surgical area    Persistent bleeding or drainage from the wound   Severe calf pain or tenderness   You are unable to do the exercises    Post-operative appointment   You should follow up with Dr. Castillo in 2 weeks following your operation if you have gone home from the hospital and approximately 3-4 weeks if you have been in a Skilled Nursing Facility for rehabilitation. If you remain in a nursing facility at the 4 week mane you should have x-rays, AP/Lat of affected hip to include entire prosthesis, taken and sent to your surgeon's office. As long as x-rays are taken at the nursing facility and reviewed by your surgeon then you do not need to come into the office for an appointment.  Please call to confirm appointment. Do not hesitate to call with any questions or concerns.    Woody Castillo, DO  Orthopaedic Surgery  Sports Medicine & Shoulder  [facility]   [place] Office - [phone]  [place] Office - [phone]

## 2024-08-26 NOTE — ANESTHESIA POSTPROCEDURE EVALUATION
Patient: Della Gordillo    Procedure Summary       Date: 08/26/24 Room / Location: [facility] OR 12 / [facility]    Anesthesia Start: 1245 Anesthesia Stop:     Procedure: RIGHT BIPOLAR HEMIARTHROPLASTY (Right: Hip) Diagnosis:       Closed fracture of neck of right femur, initial encounter (Multi)      (Closed fracture of neck of right femur, initial encounter (Multi) [S72.001A])    Surgeons: Woody Castillo DO Responsible Provider: Rafael Vidales MD    Anesthesia Type: general ASA Status: 3            Anesthesia Type: general    Vitals Value Taken Time   /95 08/26/24 1453   Temp 36.2 08/26/24 1455   Pulse 84 08/26/24 1454   Resp 20 08/26/24 1455   SpO2 98 % 08/26/24 1454   Vitals shown include unfiled device data.    Anesthesia Post Evaluation    Patient location during evaluation: PACU  Patient participation: complete - patient cannot participate  Level of consciousness: sleepy but conscious  Pain management: inadequate  Airway patency: patent  Cardiovascular status: hypertensive  Respiratory status: acceptable  Hydration status: acceptable  Postoperative Nausea and Vomiting: none  Comments: Bp treated with 2 mg morphine. Follow up bp 195/86. Continue to monitor.        There were no known notable events for this encounter.

## 2024-08-26 NOTE — NURSING NOTE
Pts POA/daughter Della asked that her sister in law, Jeane be allowed along with her brother, any information regarding care /questions about her mother.

## 2024-08-26 NOTE — PROGRESS NOTES
Della Gordillo is a 89 y.o. female on day 1 of admission presenting with Fall, initial encounter.      Subjective   Patient fully evaluated on August 26 and cleared for surgical intervention.       Objective     Last Recorded Vitals  /69 (BP Location: Right arm, Patient Position: Lying)   Pulse 71   Temp 36.7 °C (98.1 °F) (Temporal)   Resp 20   Wt 68 kg (150 lb)   SpO2 93%   Intake/Output last 3 Shifts:    Intake/Output Summary (Last 24 hours) at 8/26/2024 1009  Last data filed at 8/26/2024 0500  Gross per 24 hour   Intake 210 ml   Output 600 ml   Net -390 ml       Admission Weight  Weight: 68 kg (150 lb) (08/25/24 0622)    Daily Weight  08/25/24 : 68 kg (150 lb)    Image Results  XR pelvis 1-2 views  Narrative: Interpreted By:  Amada Chang,   STUDY:  XR PELVIS 1-2 VIEWS; ;  8/25/2024 8:12 am      INDICATION:  Signs/Symptoms:right hip/upper femur pain.      COMPARISON:  None.      ACCESSION NUMBER(S):  [id number]      ORDERING CLINICIAN:  ALLISON NEIL      FINDINGS:  AP view of the pelvis shows an acute right femoral neck fracture with  foreshortening. The remaining bones and joints appear intact      Impression: Acute displaced right femoral neck fracture          MACRO:  None      Signed by: Amada Chang 8/25/2024 8:29 AM  Dictation workstation:   [id number]  XR femur right 2+ views  Narrative: Interpreted By:  Amada Chang,   STUDY:  XR FEMUR RIGHT 2+ VIEWS; ;  8/25/2024 8:12 am      INDICATION:  Signs/Symptoms:fall/tender proximal.      COMPARISON:  None.      ACCESSION NUMBER(S):  [id number]      ORDERING CLINICIAN:  CHIKA MARTINEZ      FINDINGS:  Two views right femur      There is a fracture through the right femoral neck with  foreshortening. Degenerative changes are seen on the greater  trochanter. Sclerosis of the distal right femur probably represents a  bone infarct.      Impression: Acute fracture of the neck of the right femur with foreshortening          MACRO:  None      Signed by:  Amada Chang 8/25/2024 8:28 AM  Dictation workstation:   [id number]  XR chest 1 view  Narrative: Interpreted By:  Amada Chang,   STUDY:  XR CHEST 1 VIEW 8/25/2024 8:12 am      INDICATION:  Signs/Symptoms:fall      COMPARISON:  None      ACCESSION NUMBER(S):  [id number]      ORDERING CLINICIAN:  CHIKA MARTINEZ      TECHNIQUE:  AP view      FINDINGS:  Is enlargement of the cardiac silhouette. Pulmonary vascularity is  normal. No infiltrates. No pneumothorax or pleural effusion. Healed  right rib fractures are noted      Impression: No acute abnormalities      Signed by: Amada Chang 8/25/2024 8:27 AM  Dictation workstation:   [id number]  CT cervical spine wo IV contrast  Narrative: Interpreted By:  Amada Chang,   STUDY:  CT CERVICAL SPINE WO IV CONTRAST;  8/25/2024 7:00 am      INDICATION:  Signs/Symptoms:fall.      COMPARISON:  None.      ACCESSION NUMBER(S):  [id number]      ORDERING CLINICIAN:  CHIKA MARTINEZ      TECHNIQUE:  Axial CT images of the cervical spine are obtained. Axial, coronal  and sagittal reconstructions are provided for review.      All CT exams are performed with 1 or more of the following dose  reduction techniques: Automatic exposure control, adjustment of mA  and/or kv according to patient size, or use of iterative  reconstruction techniques.      FINDINGS:  There is straightening of the cervical lordosis. Multilevel disc  space narrowing and spurring noted with facet and uncovertebral joint  hypertrophy. Arthritic pseudo subluxation of C2 on C3 and C7 on T1  noted bony encroachment on neural foramina due to uncovertebral joint  hypertrophy most severe involving the right C6-7 foramen.      Aberrant right subclavian artery and ectasia of the thoracic aorta  noted. Aortic arch has short axis measurement of 3.3 cm      Impression:     1. No acute fracture or traumatic subluxation  2. Multilevel cervical spondylosis      MACRO:  None      Signed by: Amada Chang 8/25/2024 8:10  AM  Dictation workstation:   [id number]  CT thoracic spine wo IV contrast  Narrative: Interpreted By:  Amada Chang,   STUDY:  CT THORACIC SPINE WO IV CONTRAST;  8/25/2024 7:00 am      INDICATION:  Signs/Symptoms:fall.      COMPARISON:  None.      ACCESSION NUMBER(S):  [id number]      ORDERING CLINICIAN:  CHIKA MARTINEZ      TECHNIQUE:  Axial CT images of the thoracic spine are obtained. Axial, coronal  and sagittal reconstructions are submitted for review.      FINDINGS:  There is an acute compression fracture of the superior endplate of L1  with minimal retropulsion of the posterosuperior endplate.      The remaining thoracic vertebral body heights are maintained.  Alignment is satisfactory. Disc space narrowing and spurring is seen  throughout the thoracic spine. No bony canal stenosis or bony  foraminal stenosis noted.      Incidental note is made of an aberrant right subclavian artery.  Ectasia of the ascending thoracic aorta measures 3.9 cm short axis.  Mild cardiomegaly with mild atherosclerotic coronary artery  calcifications. Coarsened interstitial markings in the lung bases  probably due to chronic change mild scoliosis noted.      Impression:     1. Diffuse thoracic spondylosis and mild scoliosis. No acute bony  abnormalities in the thoracic spine.  2. Acute compression fracture of L1          MACRO:  None      Signed by: Amada Chang 8/25/2024 8:04 AM  Dictation workstation:   [id number]  CT lumbar spine wo IV contrast  Narrative: Interpreted By:  Amada Chang,   STUDY:  CT LUMBAR SPINE WO IV CONTRAST  8/25/2024 7:00 am      INDICATION:  Signs/Symptoms:fall, history of recent lumbar fracture      COMPARISON:  None.      ACCESSION NUMBER(S):  [id number]      ORDERING CLINICIAN:  CHIKA MARTINEZ      TECHNIQUE:  Axial CT images of the lumbar spine are obtained. Axial, coronal and  sagittal reconstructions are provided for review.      All CT exams are performed with 1 or more of the following  dose  reduction techniques: Automatic exposure control, adjustment of mA  and/or kv according to patient size, or use of iterative  reconstruction techniques.      FINDINGS:  T12-L1: There is an acute compression fracture of the superior  endplate of L1 with 2 mm of retropulsion of the posterosuperior  endplate. Minimal bony canal stenosis. No foraminal stenosis.      L1-2: There is a chronic appearing compression fracture of the  superior endplate of L2. There is a broad-based disc bulge. There is  no bony canal or bony foraminal stenosis. Schmorl's node also noted  in the superior endplate of L2.      L2-3: Small posterior osteophyte disc complex effaces the ventral  thecal sac. Posterior element hypertrophy is noted. There is lumbar  canal stenosis. No bony foraminal stenosis.      L3-4: There is 2 mm of anterior subluxation of L3 on L4 with a  broad-based disc bulge and posterior element hypertrophy. Canal  stenosis due to degenerative changes. No bony foraminal stenosis      L4-5: Slight disc space narrowing with broad-based disc bulge and  posterior element hypertrophy. Canal stenosis noted due to  degenerative changes.      L5-S1: Broad-based disc bulge. No bony canal or bony foraminal  stenosis.      Additional findings include hyperdense material layering in the  gallbladder and colonic diverticulosis.      Impression: 1. Acute compression fracture of L1 with 2 mm of retropulsion of the  posterosuperior endplate causing minimal bony canal stenosis.  2. Remote appearing L2 compression fracture  3. Multilevel lumbar spondylosis      MACRO:  None      Signed by: Amada Chang 8/25/2024 8:00 AM  Dictation workstation:   [id number]  CT head wo IV contrast  Narrative: Interpreted By:  Amada Chang,   STUDY:  CT HEAD WO IV CONTRAST;  8/25/2024 7:00 am      INDICATION:  Signs/Symptoms:fall.      COMPARISON:  None..      ACCESSION NUMBER(S):  [id number]      ORDERING CLINICIAN:  CHIKA MARTINEZ      TECHNIQUE:  CT  axial images through the Brain were obtained without contrast.      All CT exams are performed with 1 or more of the following dose  reduction techniques: Automatic exposure control, adjustment of mA  and/or kv according to patient size, or use of iterative  reconstruction techniques.      FINDINGS:  There is mass effect or acute intracranial hemorrhage. Tiny remote  left basal ganglia lacunar infarct. Ventricles are enlarged likely  related to diffuse cerebral atrophy. Gray-white differentiation is  maintained.      Paranasal sinus mucosal thickening noted.. Lens replacements are seen.      Impression: No acute intracranial abnormality.      MACRO:  None.      Signed by: Amada Chang 8/25/2024 7:54 AM  Dictation workstation:   [id number]      Physical Exam    Relevant Results               Assessment/Plan                  Assessment & Plan  Fall, initial encounter  Acute displaced right femoral neck fracture  Mechanical fall  History of recent L1 compression fracture  Osteoporosis  -Orthopedic surgery consult and recommendations appreciated  -Imaging results as above  -N.p.o. for possible surgical intervention  -Pain control  -Urinalysis ordered  -PT/OT, fall precautions    Elevated high-sensitivity troponins  Hyperlipidemia  Hypertension  -Initial troponin 17, delta troponin 17.  Third ordered, trend  -EKG, per ED physician, shows normal sinus rate and rhythm with frequent PVCs.  No STEMI    Hypokalemia  -Potassium 2.9 on arrival, replacement ordered in the ED  -Monitor with BMP in the a.m.    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 for possible surgical intervention, continue home medications when able    DVT prophylaxis  -No chemical anticoagulation due to possible surgical intervention  -SCDs    Closed fracture of neck of right femur (Multi)        Pedro Junior MD  Physician  Internal Medicine     H&P      Addendum      Date of Service: 8/25/2024  9:51 AM     Addendum       Expand All Collapse All    History Of Present Illness  Della Gordillo is a 89 y.o. female with a past medical history of DDD (cervical), hypercholesterolemia, hyperlipidemia, hypothyroidism, osteoporosis, and recent L1 compression fracture who presents to the emergency department today by ambulance from the [facility] for a fall.  Per EMS, they were called to the [facility] this morning after patient was found on the floor out of bed. Upon my arrival, I have just woken patient out of sleep, so she is mildly confused.  She asked me where the dog is.  I believe she was just dreaming, because when I asked her her name, where she was, and what month it was, she answered all 3 questions correctly.  She is hard of hearing.  She tells me this morning that she fell out of bed, but does not know how.  She denies feeling dizzy or passing out before hand.  She does state that she hit her head, but denies LOC and use of blood thinners.  She does endorse a mild cough, but states she is not coughing anything up.  She denies fever/chills, chest pain, shortness of breath, palpitations, leg swelling, nausea/vomiting, diarrhea, urinary symptoms, numbness/tingling.  She states that she takes her medications daily, and that the staff at the [facility] watches her take them.  She states she is eating and drinking appropriately.  She states she uses a wheelchair to get around, but it is kept in a corner of her room that she cannot get to.  I informed her that she has broken her hip, and patient states she does not want any surgery. Patient has DNRCC form in her nursing home paperwork.     ED course: On arrival, patient's blood pressure 176/117, heart rate 79, respirations 17, afebrile, saturating 96% on room air.  Patient now saturating 91% on room air, BP now 176/92.  Labs and imaging performed, revealing potassium 2.9, initial and delta troponin 17, no leukocytosis or anemia.  CT imaging  shows pseudosubluxation of C2 on C3 and C7 on T1.  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  No acute intracranial abnormality.  X-ray of right femur shows acute fracture of the neck of the right femur with foreshortening.  EKG, per ED physician, shows normal sinus rate and rhythm with frequent PVCs.  No STEMI.  Patient given potassium replacement and morphine in the ED.  Patient to be admitted inpatient under Dr. Junior.     Past Medical History  Medical History        Past Medical History:   Diagnosis Date    H/O degenerative disc disease      HLD (hyperlipidemia)      Hypothyroidism      Osteoporosis              Surgical History  Surgical History   History reviewed. No pertinent surgical history.        Social History  She has no history on file for tobacco use, alcohol use, and drug use.     Family History  Family History   No family history on file.        Allergies  Patient has no known allergies.     Review of Systems     Physical Exam  Constitutional:       General: She is not in acute distress.     Appearance: She is not ill-appearing or toxic-appearing.      Comments: Patient mildly confused when I arrive for my interview. I have just woken her out of sleep, she asks me where the dog is. Once she wakes up more, she is able to tell me her name, where she is, and what month it is.    HENT:      Head: Normocephalic and atraumatic.      Nose: Nose normal.      Mouth/Throat:      Mouth: Mucous membranes are dry.      Pharynx: Oropharynx is clear.   Eyes:      Extraocular Movements: Extraocular movements intact.      Conjunctiva/sclera: Conjunctivae normal.      Pupils: Pupils are equal, round, and reactive to light.   Cardiovascular:      Rate and Rhythm: Normal rate and regular rhythm.      Pulses: Normal pulses.   Pulmonary:      Effort: Pulmonary effort is normal.      Breath sounds: Normal breath sounds.   Abdominal:      General: Abdomen is flat. Bowel sounds are normal.  "     Palpations: Abdomen is soft.      Tenderness: There is no abdominal tenderness.   Musculoskeletal:         General: Tenderness (R hip TTP. Patient tells me not to move it.) present.      Cervical back: Tenderness present.      Right lower leg: No edema.      Left lower leg: No edema.   Neurological:      General: No focal deficit present.      Mental Status: She is oriented to person, place, and time.      Sensory: No sensory deficit.      Comments: Neurovascular status intact of BLE.  Strength and sensation intact bilaterally.   Psychiatric:         Mood and Affect: Mood normal.         Behavior: Behavior normal.         Thought Content: Thought content normal.            Last Recorded Vitals  Blood pressure (!) 176/92, pulse 71, temperature 36.4 °C (97.5 °F), resp. rate 16, height 1.676 m (5' 6\"), weight 68 kg (150 lb), SpO2 (!) 91%.     Relevant Results     Scheduled medications    Scheduled Medications   potassium chloride, 20 mEq, oral, Once  potassium chloride, 20 mEq, intravenous, q2h         Continuous medications  Continuous Medications         PRN medications  PRN Medications   PRN medications: ketorolac, polyethylene glycol              Results for orders placed or performed during the hospital encounter of 08/25/24 (from the past 24 hour(s))   CBC and Auto Differential   Result Value Ref Range     WBC 8.7 4.4 - 11.3 x10*3/uL     nRBC 0.0 0.0 - 0.0 /100 WBCs     RBC 4.49 4.00 - 5.20 x10*6/uL     Hemoglobin 13.7 12.0 - 16.0 g/dL     Hematocrit 40.3 36.0 - 46.0 %     MCV 90 80 - 100 fL     MCH 30.5 26.0 - 34.0 pg     MCHC 34.0 32.0 - 36.0 g/dL     RDW 12.8 11.5 - 14.5 %     Platelets 321 150 - 450 x10*3/uL     Neutrophils % 61.4 40.0 - 80.0 %     Immature Granulocytes %, Automated 0.8 0.0 - 0.9 %     Lymphocytes % 25.3 13.0 - 44.0 %     Monocytes % 9.4 2.0 - 10.0 %     Eosinophils % 2.8 0.0 - 6.0 %     Basophils % 0.3 0.0 - 2.0 %     Neutrophils Absolute 5.33 1.60 - 5.50 x10*3/uL     Immature " Granulocytes Absolute, Automated 0.07 0.00 - 0.50 x10*3/uL     Lymphocytes Absolute 2.20 0.80 - 3.00 x10*3/uL     Monocytes Absolute 0.82 (H) 0.05 - 0.80 x10*3/uL     Eosinophils Absolute 0.24 0.00 - 0.40 x10*3/uL     Basophils Absolute 0.03 0.00 - 0.10 x10*3/uL   Comprehensive metabolic panel   Result Value Ref Range     Glucose 157 (H) 74 - 99 mg/dL     Sodium 140 136 - 145 mmol/L     Potassium 2.9 (LL) 3.5 - 5.3 mmol/L     Chloride 103 98 - 107 mmol/L     Bicarbonate 23 21 - 32 mmol/L     Anion Gap 17 10 - 20 mmol/L     Urea Nitrogen 7 6 - 23 mg/dL     Creatinine 0.63 0.50 - 1.05 mg/dL     eGFR 81 >60 mL/min/1.73m*2     Calcium 9.1 8.6 - 10.3 mg/dL     Albumin 3.9 3.4 - 5.0 g/dL     Alkaline Phosphatase 119 33 - 136 U/L     Total Protein 7.1 6.4 - 8.2 g/dL     AST 18 9 - 39 U/L     Bilirubin, Total 1.3 (H) 0.0 - 1.2 mg/dL     ALT 4 (L) 7 - 45 U/L   Magnesium   Result Value Ref Range     Magnesium 1.70 1.60 - 2.40 mg/dL   Troponin I, High Sensitivity   Result Value Ref Range     Troponin I, High Sensitivity 17 (H) 0 - 13 ng/L   Troponin I, High Sensitivity   Result Value Ref Range     Troponin I, High Sensitivity 17 (H) 0 - 13 ng/L      XR pelvis 1-2 views     Result Date: 8/25/2024  Interpreted By:  Amada Chang, STUDY: XR PELVIS 1-2 VIEWS; ;  8/25/2024 8:12 am   INDICATION: Signs/Symptoms:right hip/upper femur pain.   COMPARISON: None.   ACCESSION NUMBER(S): [id number]   ORDERING CLINICIAN: ALLISON NEIL   FINDINGS: AP view of the pelvis shows an acute right femoral neck fracture with foreshortening. The remaining bones and joints appear intact        Acute displaced right femoral neck fracture     MACRO: None   Signed by: Amada Chang 8/25/2024 8:29 AM Dictation workstation:   [id number]     XR femur right 2+ views     Result Date: 8/25/2024  Interpreted By:  Amada Chang, STUDY: XR FEMUR RIGHT 2+ VIEWS; ;  8/25/2024 8:12 am   INDICATION: Signs/Symptoms:fall/tender proximal.   COMPARISON: None.   ACCESSION  NUMBER(S): [id number]   ORDERING CLINICIAN: CHIKA MARTINEZ   FINDINGS: Two views right femur   There is a fracture through the right femoral neck with foreshortening. Degenerative changes are seen on the greater trochanter. Sclerosis of the distal right femur probably represents a bone infarct.        Acute fracture of the neck of the right femur with foreshortening     MACRO: None   Signed by: Amada Chang 8/25/2024 8:28 AM Dictation workstation:   [id number]     XR chest 1 view     Result Date: 8/25/2024  Interpreted By:  Amada Chang, STUDY: XR CHEST 1 VIEW 8/25/2024 8:12 am   INDICATION: Signs/Symptoms:fall   COMPARISON: None   ACCESSION NUMBER(S): [id number]   ORDERING CLINICIAN: CHIKA MARTINEZ   TECHNIQUE: AP view   FINDINGS: Is enlargement of the cardiac silhouette. Pulmonary vascularity is normal. No infiltrates. No pneumothorax or pleural effusion. Healed right rib fractures are noted        No acute abnormalities   Signed by: Amada Chang 8/25/2024 8:27 AM Dictation workstation:   [id number]     CT cervical spine wo IV contrast     Result Date: 8/25/2024  Interpreted By:  Amada Chang, STUDY: CT CERVICAL SPINE WO IV CONTRAST;  8/25/2024 7:00 am   INDICATION: Signs/Symptoms:fall.   COMPARISON: None.   ACCESSION NUMBER(S): [id number]   ORDERING CLINICIAN: CHIKA MARTINEZ   TECHNIQUE: Axial CT images of the cervical spine are obtained. Axial, coronal and sagittal reconstructions are provided for review.   All CT exams are performed with 1 or more of the following dose reduction techniques: Automatic exposure control, adjustment of mA and/or kv according to patient size, or use of iterative reconstruction techniques.   FINDINGS: There is straightening of the cervical lordosis. Multilevel disc space narrowing and spurring noted with facet and uncovertebral joint hypertrophy. Arthritic pseudo subluxation of C2 on C3 and C7 on T1 noted bony encroachment on neural foramina due to uncovertebral joint  hypertrophy most severe involving the right C6-7 foramen.   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 Aortic arch has short axis measurement of 3.3 cm          1. No acute fracture or traumatic subluxation 2. Multilevel cervical spondylosis   MACRO: None   Signed by: Amada Chang 8/25/2024 8:10 AM Dictation workstation:   [id number]     CT thoracic spine wo IV contrast     Result Date: 8/25/2024  Interpreted By:  Amada Chang, STUDY: CT THORACIC SPINE WO IV CONTRAST;  8/25/2024 7:00 am   INDICATION: Signs/Symptoms:fall.   COMPARISON: None.   ACCESSION NUMBER(S): [id number]   ORDERING CLINICIAN: CHIKA MARTINEZ   TECHNIQUE: Axial CT images of the thoracic spine are obtained. Axial, coronal and sagittal reconstructions are submitted for review.   FINDINGS: There is an acute compression fracture of the superior endplate of L1 with minimal retropulsion of the posterosuperior endplate.   The remaining thoracic vertebral body heights are maintained. Alignment is satisfactory. Disc space narrowing and spurring is seen throughout the thoracic spine. No bony canal stenosis or bony foraminal stenosis noted.   Incidental note is made of an aberrant right subclavian artery. Ectasia of the ascending thoracic aorta measures 3.9 cm short axis. Mild cardiomegaly with mild atherosclerotic coronary artery calcifications. Coarsened interstitial markings in the lung bases probably due to chronic change mild scoliosis noted.          1. Diffuse thoracic spondylosis and mild scoliosis. No acute bony abnormalities in the thoracic spine. 2. Acute compression fracture of L1     MACRO: None   Signed by: Amada Chang 8/25/2024 8:04 AM Dictation workstation:   [id number]     CT lumbar spine wo IV contrast     Result Date: 8/25/2024  Interpreted By:  Amada Chang, STUDY: CT LUMBAR SPINE WO IV CONTRAST  8/25/2024 7:00 am   INDICATION: Signs/Symptoms:fall, history of recent lumbar fracture   COMPARISON: None.    ACCESSION NUMBER(S): [id number]   ORDERING CLINICIAN: CHIKA MARTINEZ   TECHNIQUE: Axial CT images of the lumbar spine are obtained. Axial, coronal and sagittal reconstructions are provided for review.   All CT exams are performed with 1 or more of the following dose reduction techniques: Automatic exposure control, adjustment of mA and/or kv according to patient size, or use of iterative reconstruction techniques.   FINDINGS: T12-L1: There is an acute compression fracture of the superior endplate of L1 with 2 mm of retropulsion of the posterosuperior endplate. Minimal bony canal stenosis. No foraminal stenosis.   L1-2: There is a chronic appearing compression fracture of the superior endplate of L2. There is a broad-based disc bulge. There is no bony canal or bony foraminal stenosis. Schmorl's node also noted in the superior endplate of L2.   L2-3: Small posterior osteophyte disc complex effaces the ventral thecal sac. Posterior element hypertrophy is noted. There is lumbar canal stenosis. No bony foraminal stenosis.   L3-4: There is 2 mm of anterior subluxation of L3 on L4 with a broad-based disc bulge and posterior element hypertrophy. Canal stenosis due to degenerative changes. No bony foraminal stenosis   L4-5: Slight disc space narrowing with broad-based disc bulge and posterior element hypertrophy. Canal stenosis noted due to degenerative changes.   L5-S1: Broad-based disc bulge. No bony canal or bony foraminal stenosis.   Additional findings include hyperdense material layering in the gallbladder and colonic diverticulosis.        1. Acute compression fracture of L1 with 2 mm of retropulsion of the posterosuperior endplate causing minimal bony canal stenosis. 2. Remote appearing L2 compression fracture 3. Multilevel lumbar spondylosis   MACRO: None   Signed by: Amada Chang 8/25/2024 8:00 AM Dictation workstation:   [id number]     CT head wo IV contrast     Result Date: 8/25/2024  Interpreted By:  Charlene  Amada, STUDY: CT HEAD WO IV CONTRAST;  8/25/2024 7:00 am   INDICATION: Signs/Symptoms:fall.   COMPARISON: None..   ACCESSION NUMBER(S): [id number]   ORDERING CLINICIAN: CHIKA MARTINEZ   TECHNIQUE: CT axial images through the Brain were obtained without contrast.   All CT exams are performed with 1 or more of the following dose reduction techniques: Automatic exposure control, adjustment of mA and/or kv according to patient size, or use of iterative reconstruction techniques.   FINDINGS: There is mass effect or acute intracranial hemorrhage. Tiny remote left basal ganglia lacunar infarct. Ventricles are enlarged likely related to diffuse cerebral atrophy. Gray-white differentiation is maintained.   Paranasal sinus mucosal thickening noted.. Lens replacements are seen.        No acute intracranial abnormality.   MACRO: None.   Signed by: Amada Chang 8/25/2024 7:54 AM Dictation workstation:   [id number]            Assessment/Plan        Assessment & Plan  Fall, initial encounter  Acute displaced right femoral neck fracture  Mechanical fall  History of recent L1 compression fracture  Osteoporosis  -Orthopedic surgery consult and recommendations appreciated  -Imaging results as above  -N.p.o. for possible surgical intervention  -Pain control  -Urinalysis ordered  -PT/OT, fall precautions     Elevated high-sensitivity troponins  Hyperlipidemia  Hypertension  -Initial troponin 17, delta troponin 17.  Third ordered, trend  -EKG, per ED physician, shows normal sinus rate and rhythm with frequent PVCs.  No STEMI     Hypokalemia  -Potassium 2.9 on arrival, replacement ordered in the ED  -Monitor with BMP in the a.m.     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 for possible surgical intervention, continue home medications when able     DVT prophylaxis  -No chemical anticoagulation due to possible surgical intervention  -SCDs      Patient fully evaluated on August 25.  Critical correct electrolyte disturbances before surgical intervention.     I spent 45 minutes in the professional and overall care of this patient.                      Revision History                     Pedro Junior MD

## 2024-08-27 LAB
ANION GAP SERPL CALC-SCNC: 15 MMOL/L (ref 10–20)
BUN SERPL-MCNC: 13 MG/DL (ref 6–23)
CALCIUM SERPL-MCNC: 8.9 MG/DL (ref 8.6–10.3)
CHLORIDE SERPL-SCNC: 101 MMOL/L (ref 98–107)
CO2 SERPL-SCNC: 23 MMOL/L (ref 21–32)
CREAT SERPL-MCNC: 0.73 MG/DL (ref 0.5–1.05)
EGFRCR SERPLBLD CKD-EPI 2021: 75 ML/MIN/1.73M*2
ERYTHROCYTE [DISTWIDTH] IN BLOOD BY AUTOMATED COUNT: 13 % (ref 11.5–14.5)
GLUCOSE SERPL-MCNC: 170 MG/DL (ref 74–99)
HCT VFR BLD AUTO: 33.2 % (ref 36–46)
HGB BLD-MCNC: 11.1 G/DL (ref 12–16)
MCH RBC QN AUTO: 30.3 PG (ref 26–34)
MCHC RBC AUTO-ENTMCNC: 33.4 G/DL (ref 32–36)
MCV RBC AUTO: 91 FL (ref 80–100)
NRBC BLD-RTO: 0 /100 WBCS (ref 0–0)
PLATELET # BLD AUTO: 275 X10*3/UL (ref 150–450)
POTASSIUM SERPL-SCNC: 3.7 MMOL/L (ref 3.5–5.3)
RBC # BLD AUTO: 3.66 X10*6/UL (ref 4–5.2)
SODIUM SERPL-SCNC: 135 MMOL/L (ref 136–145)
WBC # BLD AUTO: 10.4 X10*3/UL (ref 4.4–11.3)

## 2024-08-27 PROCEDURE — 80048 BASIC METABOLIC PNL TOTAL CA: CPT | Performed by: STUDENT IN AN ORGANIZED HEALTH CARE EDUCATION/TRAINING PROGRAM

## 2024-08-27 PROCEDURE — 1100000001 HC PRIVATE ROOM DAILY

## 2024-08-27 PROCEDURE — 97165 OT EVAL LOW COMPLEX 30 MIN: CPT | Mod: GO

## 2024-08-27 PROCEDURE — 85027 COMPLETE CBC AUTOMATED: CPT | Performed by: STUDENT IN AN ORGANIZED HEALTH CARE EDUCATION/TRAINING PROGRAM

## 2024-08-27 PROCEDURE — 97161 PT EVAL LOW COMPLEX 20 MIN: CPT | Mod: GP

## 2024-08-27 PROCEDURE — 2500000004 HC RX 250 GENERAL PHARMACY W/ HCPCS (ALT 636 FOR OP/ED): Performed by: STUDENT IN AN ORGANIZED HEALTH CARE EDUCATION/TRAINING PROGRAM

## 2024-08-27 PROCEDURE — 2500000001 HC RX 250 WO HCPCS SELF ADMINISTERED DRUGS (ALT 637 FOR MEDICARE OP): Performed by: STUDENT IN AN ORGANIZED HEALTH CARE EDUCATION/TRAINING PROGRAM

## 2024-08-27 PROCEDURE — 36415 COLL VENOUS BLD VENIPUNCTURE: CPT | Performed by: STUDENT IN AN ORGANIZED HEALTH CARE EDUCATION/TRAINING PROGRAM

## 2024-08-27 ASSESSMENT — COGNITIVE AND FUNCTIONAL STATUS - GENERAL
WALKING IN HOSPITAL ROOM: TOTAL
PERSONAL GROOMING: A LOT
TURNING FROM BACK TO SIDE WHILE IN FLAT BAD: A LITTLE
DAILY ACTIVITIY SCORE: 13
PERSONAL GROOMING: A LITTLE
EATING MEALS: A LITTLE
DAILY ACTIVITIY SCORE: 14
MOVING FROM LYING ON BACK TO SITTING ON SIDE OF FLAT BED WITH BEDRAILS: A LOT
MOVING TO AND FROM BED TO CHAIR: A LOT
DRESSING REGULAR UPPER BODY CLOTHING: A LITTLE
CLIMB 3 TO 5 STEPS WITH RAILING: TOTAL
MOVING TO AND FROM BED TO CHAIR: TOTAL
DRESSING REGULAR LOWER BODY CLOTHING: A LOT
MOBILITY SCORE: 7
MOBILITY SCORE: 12
STANDING UP FROM CHAIR USING ARMS: A LOT
MOVING FROM LYING ON BACK TO SITTING ON SIDE OF FLAT BED WITH BEDRAILS: A LITTLE
DRESSING REGULAR LOWER BODY CLOTHING: TOTAL
WALKING IN HOSPITAL ROOM: TOTAL
TOILETING: TOTAL
TOILETING: A LITTLE
EATING MEALS: A LOT
TURNING FROM BACK TO SIDE WHILE IN FLAT BAD: TOTAL
HELP NEEDED FOR BATHING: A LOT
DRESSING REGULAR UPPER BODY CLOTHING: A LITTLE
STANDING UP FROM CHAIR USING ARMS: TOTAL
CLIMB 3 TO 5 STEPS WITH RAILING: TOTAL
HELP NEEDED FOR BATHING: A LOT

## 2024-08-27 ASSESSMENT — PAIN DESCRIPTION - LOCATION
LOCATION: HIP
LOCATION: HIP

## 2024-08-27 ASSESSMENT — PAIN SCALES - PAIN ASSESSMENT IN ADVANCED DEMENTIA (PAINAD)
FACIALEXPRESSION: SMILING OR INEXPRESSIVE
FACIALEXPRESSION: SMILING OR INEXPRESSIVE
FACIALEXPRESSION: FACIAL GRIMACING

## 2024-08-27 ASSESSMENT — PAIN DESCRIPTION - ORIENTATION
ORIENTATION: RIGHT
ORIENTATION: RIGHT

## 2024-08-27 ASSESSMENT — PAIN SCALES - WONG BAKER
WONGBAKER_NUMERICALRESPONSE: NO HURT
WONGBAKER_NUMERICALRESPONSE: NO HURT
WONGBAKER_NUMERICALRESPONSE: HURTS EVEN MORE

## 2024-08-27 ASSESSMENT — PAIN - FUNCTIONAL ASSESSMENT: PAIN_FUNCTIONAL_ASSESSMENT: 0-10

## 2024-08-27 ASSESSMENT — PAIN SCALES - GENERAL
PAINLEVEL_OUTOF10: 0 - NO PAIN
PAINLEVEL_OUTOF10: 8
PAINLEVEL_OUTOF10: 3
PAINLEVEL_OUTOF10: 8

## 2024-08-27 NOTE — ASSESSMENT & PLAN NOTE
Pedro Junior MD  Physician  Internal Medicine     H&P      Addendum     Date of Service: 8/25/2024  9:51 AM     Addendum       Expand All Collapse All    History Of Present Illness  Della Gordillo is a 89 y.o. female with a past medical history of DDD (cervical), hypercholesterolemia, hyperlipidemia, hypothyroidism, osteoporosis, and recent L1 compression fracture who presents to the emergency department today by ambulance from the [facility] for a fall.  Per EMS, they were called to the [facility] this morning after patient was found on the floor out of bed. Upon my arrival, I have just woken patient out of sleep, so she is mildly confused.  She asked me where the dog is.  I believe she was just dreaming, because when I asked her her name, where she was, and what month it was, she answered all 3 questions correctly.  She is hard of hearing.  She tells me this morning that she fell out of bed, but does not know how.  She denies feeling dizzy or passing out before hand.  She does state that she hit her head, but denies LOC and use of blood thinners.  She does endorse a mild cough, but states she is not coughing anything up.  She denies fever/chills, chest pain, shortness of breath, palpitations, leg swelling, nausea/vomiting, diarrhea, urinary symptoms, numbness/tingling.  She states that she takes her medications daily, and that the staff at the [facility] watches her take them.  She states she is eating and drinking appropriately.  She states she uses a wheelchair to get around, but it is kept in a corner of her room that she cannot get to.  I informed her that she has broken her hip, and patient states she does not want any surgery. Patient has DNRCC form in her nursing home paperwork.     ED course: On arrival, patient's blood pressure 176/117, heart rate 79, respirations 17, afebrile, saturating 96% on room air.  Patient now saturating 91% on room air, BP now 176/92.  Labs and imaging performed, revealing  potassium 2.9, initial and delta troponin 17, no leukocytosis or anemia.  CT imaging shows pseudosubluxation of C2 on C3 and C7 on T1.  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  No acute intracranial abnormality.  X-ray of right femur shows acute fracture of the neck of the right femur with foreshortening.  EKG, per ED physician, shows normal sinus rate and rhythm with frequent PVCs.  No STEMI.  Patient given potassium replacement and morphine in the ED.  Patient to be admitted inpatient under Dr. Junior.     Past Medical History  Medical History        Past Medical History:   Diagnosis Date    H/O degenerative disc disease      HLD (hyperlipidemia)      Hypothyroidism      Osteoporosis              Surgical History  Surgical History   History reviewed. No pertinent surgical history.        Social History  She has no history on file for tobacco use, alcohol use, and drug use.     Family History  Family History   No family history on file.        Allergies  Patient has no known allergies.     Review of Systems     Physical Exam  Constitutional:       General: She is not in acute distress.     Appearance: She is not ill-appearing or toxic-appearing.      Comments: Patient mildly confused when I arrive for my interview. I have just woken her out of sleep, she asks me where the dog is. Once she wakes up more, she is able to tell me her name, where she is, and what month it is.    HENT:      Head: Normocephalic and atraumatic.      Nose: Nose normal.      Mouth/Throat:      Mouth: Mucous membranes are dry.      Pharynx: Oropharynx is clear.   Eyes:      Extraocular Movements: Extraocular movements intact.      Conjunctiva/sclera: Conjunctivae normal.      Pupils: Pupils are equal, round, and reactive to light.   Cardiovascular:      Rate and Rhythm: Normal rate and regular rhythm.      Pulses: Normal pulses.   Pulmonary:      Effort: Pulmonary effort is normal.      Breath sounds: Normal  "breath sounds.   Abdominal:      General: Abdomen is flat. Bowel sounds are normal.      Palpations: Abdomen is soft.      Tenderness: There is no abdominal tenderness.   Musculoskeletal:         General: Tenderness (R hip TTP. Patient tells me not to move it.) present.      Cervical back: Tenderness present.      Right lower leg: No edema.      Left lower leg: No edema.   Neurological:      General: No focal deficit present.      Mental Status: She is oriented to person, place, and time.      Sensory: No sensory deficit.      Comments: Neurovascular status intact of BLE.  Strength and sensation intact bilaterally.   Psychiatric:         Mood and Affect: Mood normal.         Behavior: Behavior normal.         Thought Content: Thought content normal.            Last Recorded Vitals  Blood pressure (!) 176/92, pulse 71, temperature 36.4 °C (97.5 °F), resp. rate 16, height 1.676 m (5' 6\"), weight 68 kg (150 lb), SpO2 (!) 91%.     Relevant Results     Scheduled medications    Scheduled Medications   potassium chloride, 20 mEq, oral, Once  potassium chloride, 20 mEq, intravenous, q2h         Continuous medications  Continuous Medications         PRN medications  PRN Medications   PRN medications: ketorolac, polyethylene glycol              Results for orders placed or performed during the hospital encounter of 08/25/24 (from the past 24 hour(s))   CBC and Auto Differential   Result Value Ref Range     WBC 8.7 4.4 - 11.3 x10*3/uL     nRBC 0.0 0.0 - 0.0 /100 WBCs     RBC 4.49 4.00 - 5.20 x10*6/uL     Hemoglobin 13.7 12.0 - 16.0 g/dL     Hematocrit 40.3 36.0 - 46.0 %     MCV 90 80 - 100 fL     MCH 30.5 26.0 - 34.0 pg     MCHC 34.0 32.0 - 36.0 g/dL     RDW 12.8 11.5 - 14.5 %     Platelets 321 150 - 450 x10*3/uL     Neutrophils % 61.4 40.0 - 80.0 %     Immature Granulocytes %, Automated 0.8 0.0 - 0.9 %     Lymphocytes % 25.3 13.0 - 44.0 %     Monocytes % 9.4 2.0 - 10.0 %     Eosinophils % 2.8 0.0 - 6.0 %     Basophils % 0.3 " 0.0 - 2.0 %     Neutrophils Absolute 5.33 1.60 - 5.50 x10*3/uL     Immature Granulocytes Absolute, Automated 0.07 0.00 - 0.50 x10*3/uL     Lymphocytes Absolute 2.20 0.80 - 3.00 x10*3/uL     Monocytes Absolute 0.82 (H) 0.05 - 0.80 x10*3/uL     Eosinophils Absolute 0.24 0.00 - 0.40 x10*3/uL     Basophils Absolute 0.03 0.00 - 0.10 x10*3/uL   Comprehensive metabolic panel   Result Value Ref Range     Glucose 157 (H) 74 - 99 mg/dL     Sodium 140 136 - 145 mmol/L     Potassium 2.9 (LL) 3.5 - 5.3 mmol/L     Chloride 103 98 - 107 mmol/L     Bicarbonate 23 21 - 32 mmol/L     Anion Gap 17 10 - 20 mmol/L     Urea Nitrogen 7 6 - 23 mg/dL     Creatinine 0.63 0.50 - 1.05 mg/dL     eGFR 81 >60 mL/min/1.73m*2     Calcium 9.1 8.6 - 10.3 mg/dL     Albumin 3.9 3.4 - 5.0 g/dL     Alkaline Phosphatase 119 33 - 136 U/L     Total Protein 7.1 6.4 - 8.2 g/dL     AST 18 9 - 39 U/L     Bilirubin, Total 1.3 (H) 0.0 - 1.2 mg/dL     ALT 4 (L) 7 - 45 U/L   Magnesium   Result Value Ref Range     Magnesium 1.70 1.60 - 2.40 mg/dL   Troponin I, High Sensitivity   Result Value Ref Range     Troponin I, High Sensitivity 17 (H) 0 - 13 ng/L   Troponin I, High Sensitivity   Result Value Ref Range     Troponin I, High Sensitivity 17 (H) 0 - 13 ng/L      XR pelvis 1-2 views     Result Date: 8/25/2024  Interpreted By:  Amada Chang, STUDY: XR PELVIS 1-2 VIEWS; ;  8/25/2024 8:12 am   INDICATION: Signs/Symptoms:right hip/upper femur pain.   COMPARISON: None.   ACCESSION NUMBER(S): [id number]   ORDERING CLINICIAN: ALLISON NEIL   FINDINGS: AP view of the pelvis shows an acute right femoral neck fracture with foreshortening. The remaining bones and joints appear intact        Acute displaced right femoral neck fracture     MACRO: None   Signed by: Amada Chang 8/25/2024 8:29 AM Dictation workstation:   [id number]     XR femur right 2+ views     Result Date: 8/25/2024  Interpreted By:  Amada Chang, STUDY: XR FEMUR RIGHT 2+ VIEWS; ;  8/25/2024 8:12 am    INDICATION: Signs/Symptoms:fall/tender proximal.   COMPARISON: None.   ACCESSION NUMBER(S): [id number]   ORDERING CLINICIAN: CHIKA MARTINEZ   FINDINGS: Two views right femur   There is a fracture through the right femoral neck with foreshortening. Degenerative changes are seen on the greater trochanter. Sclerosis of the distal right femur probably represents a bone infarct.        Acute fracture of the neck of the right femur with foreshortening     MACRO: None   Signed by: Amada Chang 8/25/2024 8:28 AM Dictation workstation:   [id number]     XR chest 1 view     Result Date: 8/25/2024  Interpreted By:  Amada Chang, STUDY: XR CHEST 1 VIEW 8/25/2024 8:12 am   INDICATION: Signs/Symptoms:fall   COMPARISON: None   ACCESSION NUMBER(S): [id number]   ORDERING CLINICIAN: CHIKA MARTINEZ   TECHNIQUE: AP view   FINDINGS: Is enlargement of the cardiac silhouette. Pulmonary vascularity is normal. No infiltrates. No pneumothorax or pleural effusion. Healed right rib fractures are noted        No acute abnormalities   Signed by: Amada Chang 8/25/2024 8:27 AM Dictation workstation:   [id number]     CT cervical spine wo IV contrast     Result Date: 8/25/2024  Interpreted By:  Amada Chang, STUDY: CT CERVICAL SPINE WO IV CONTRAST;  8/25/2024 7:00 am   INDICATION: Signs/Symptoms:fall.   COMPARISON: None.   ACCESSION NUMBER(S): [id number]   ORDERING CLINICIAN: CHIKA MARTINEZ   TECHNIQUE: Axial CT images of the cervical spine are obtained. Axial, coronal and sagittal reconstructions are provided for review.   All CT exams are performed with 1 or more of the following dose reduction techniques: Automatic exposure control, adjustment of mA and/or kv according to patient size, or use of iterative reconstruction techniques.   FINDINGS: There is straightening of the cervical lordosis. Multilevel disc space narrowing and spurring noted with facet and uncovertebral joint hypertrophy. Arthritic pseudo subluxation of C2 on C3 and C7  on T1 noted bony encroachment on neural foramina due to uncovertebral joint hypertrophy most severe involving the right C6-7 foramen.   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 Aortic arch has short axis measurement of 3.3 cm          1. No acute fracture or traumatic subluxation 2. Multilevel cervical spondylosis   MACRO: None   Signed by: Amada Chang 8/25/2024 8:10 AM Dictation workstation:   [id number]     CT thoracic spine wo IV contrast     Result Date: 8/25/2024  Interpreted By:  Amada Chang, STUDY: CT THORACIC SPINE WO IV CONTRAST;  8/25/2024 7:00 am   INDICATION: Signs/Symptoms:fall.   COMPARISON: None.   ACCESSION NUMBER(S): [id number]   ORDERING CLINICIAN: CHIKA MARTINEZ   TECHNIQUE: Axial CT images of the thoracic spine are obtained. Axial, coronal and sagittal reconstructions are submitted for review.   FINDINGS: There is an acute compression fracture of the superior endplate of L1 with minimal retropulsion of the posterosuperior endplate.   The remaining thoracic vertebral body heights are maintained. Alignment is satisfactory. Disc space narrowing and spurring is seen throughout the thoracic spine. No bony canal stenosis or bony foraminal stenosis noted.   Incidental note is made of an aberrant right subclavian artery. Ectasia of the ascending thoracic aorta measures 3.9 cm short axis. Mild cardiomegaly with mild atherosclerotic coronary artery calcifications. Coarsened interstitial markings in the lung bases probably due to chronic change mild scoliosis noted.          1. Diffuse thoracic spondylosis and mild scoliosis. No acute bony abnormalities in the thoracic spine. 2. Acute compression fracture of L1     MACRO: None   Signed by: Amada Chang 8/25/2024 8:04 AM Dictation workstation:   [id number]     CT lumbar spine wo IV contrast     Result Date: 8/25/2024  Interpreted By:  Amada Chang, STUDY: CT LUMBAR SPINE WO IV CONTRAST  8/25/2024 7:00 am   INDICATION:  Signs/Symptoms:fall, history of recent lumbar fracture   COMPARISON: None.   ACCESSION NUMBER(S): [id number]   ORDERING CLINICIAN: CHIKA MARTINEZ   TECHNIQUE: Axial CT images of the lumbar spine are obtained. Axial, coronal and sagittal reconstructions are provided for review.   All CT exams are performed with 1 or more of the following dose reduction techniques: Automatic exposure control, adjustment of mA and/or kv according to patient size, or use of iterative reconstruction techniques.   FINDINGS: T12-L1: There is an acute compression fracture of the superior endplate of L1 with 2 mm of retropulsion of the posterosuperior endplate. Minimal bony canal stenosis. No foraminal stenosis.   L1-2: There is a chronic appearing compression fracture of the superior endplate of L2. There is a broad-based disc bulge. There is no bony canal or bony foraminal stenosis. Schmorl's node also noted in the superior endplate of L2.   L2-3: Small posterior osteophyte disc complex effaces the ventral thecal sac. Posterior element hypertrophy is noted. There is lumbar canal stenosis. No bony foraminal stenosis.   L3-4: There is 2 mm of anterior subluxation of L3 on L4 with a broad-based disc bulge and posterior element hypertrophy. Canal stenosis due to degenerative changes. No bony foraminal stenosis   L4-5: Slight disc space narrowing with broad-based disc bulge and posterior element hypertrophy. Canal stenosis noted due to degenerative changes.   L5-S1: Broad-based disc bulge. No bony canal or bony foraminal stenosis.   Additional findings include hyperdense material layering in the gallbladder and colonic diverticulosis.        1. Acute compression fracture of L1 with 2 mm of retropulsion of the posterosuperior endplate causing minimal bony canal stenosis. 2. Remote appearing L2 compression fracture 3. Multilevel lumbar spondylosis   MACRO: None   Signed by: Amada Chang 8/25/2024 8:00 AM Dictation workstation:   [id number]      CT head wo IV contrast     Result Date: 8/25/2024  Interpreted By:  Amada Chang, STUDY: CT HEAD WO IV CONTRAST;  8/25/2024 7:00 am   INDICATION: Signs/Symptoms:fall.   COMPARISON: None..   ACCESSION NUMBER(S): [id number]   ORDERING CLINICIAN: CHIKA MARTINEZ   TECHNIQUE: CT axial images through the Brain were obtained without contrast.   All CT exams are performed with 1 or more of the following dose reduction techniques: Automatic exposure control, adjustment of mA and/or kv according to patient size, or use of iterative reconstruction techniques.   FINDINGS: There is mass effect or acute intracranial hemorrhage. Tiny remote left basal ganglia lacunar infarct. Ventricles are enlarged likely related to diffuse cerebral atrophy. Gray-white differentiation is maintained.   Paranasal sinus mucosal thickening noted.. Lens replacements are seen.        No acute intracranial abnormality.   MACRO: None.   Signed by: Amada Chang 8/25/2024 7:54 AM Dictation workstation:   [id number]            Assessment/Plan        Assessment & Plan  Fall, initial encounter  Acute displaced right femoral neck fracture  Mechanical fall  History of recent L1 compression fracture  Osteoporosis  -Orthopedic surgery consult and recommendations appreciated  -Imaging results as above  -N.p.o. for possible surgical intervention  -Pain control  -Urinalysis ordered  -PT/OT, fall precautions     Elevated high-sensitivity troponins  Hyperlipidemia  Hypertension  -Initial troponin 17, delta troponin 17.  Third ordered, trend  -EKG, per ED physician, shows normal sinus rate and rhythm with frequent PVCs.  No STEMI     Hypokalemia  -Potassium 2.9 on arrival, replacement ordered in the ED  -Monitor with BMP in the a.m.     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 for possible surgical intervention, continue home medications when able     DVT prophylaxis  -No  chemical anticoagulation due to possible surgical intervention  -SCDs     Patient fully evaluated on August 25.  Critical correct electrolyte disturbances before surgical intervention.     I spent 45 minutes in the professional and overall care of this patient.                      Revision History

## 2024-08-27 NOTE — PROGRESS NOTES
08/27/24 1104   Discharge Planning   Living Arrangements Alone   Support Systems Family members   Assistance Needed yes   Type of Residence Skilled nursing facility   Home or Post Acute Services Post acute facilities (Rehab/SNF/etc)   Expected Discharge Disposition SNF   Does the patient need discharge transport arranged? Yes   RoundTrip coordination needed? Yes   Has discharge transport been arranged? No   Patient Choice   Provider Choice list and CMS website (https://medicare.gov/care-compare#search) for post-acute Quality and Resource Measure Data were provided and reviewed with: Family     Noted that patient with confusion. Spoke to patient's daughter per telephone. Per her daughter, the patient was at the [facility] nursing [facility] since July. She does not want the patient to return there. She is requesting an alternate facility between [facility] for family members to visit. She requested a list of facilities be left in the patient's room. Will leave a list of facilities in the patient's room.   2:51 Received voice message from patient's daughter Della requesting a referral to [facility]. Requested discharge support to make referral. Per the [facility], patient used 42 of her skilled days there, has 58 skilled days remaining.

## 2024-08-27 NOTE — PROGRESS NOTES
"ORTHOPAEDIC SURGERY POST-OP PROGRESS NOTE       SERVICE DATE: 8/27/2024   SERVICE TIME:  8:50 AM    Subjective   Seen and examined at bedside, resting comfortably.  Right hip pain well controlled.  No overnight issues.  Denies numbness or tingling RLE.        Objective   VITAL SIGNS: /77 (BP Location: Right arm, Patient Position: Lying)   Pulse 97   Temp 36.7 °C (98.1 °F) (Temporal)   Resp 18   Ht 1.676 m (5' 6\")   Wt 68 kg (150 lb)   SpO2 93%   BMI 24.21 kg/m²   INTAKE AND OUTPUT:     Intake/Output Summary (Last 24 hours) at 8/27/2024 0850  Last data filed at 8/27/2024 0800  Gross per 24 hour   Intake 1831.67 ml   Output 50 ml   Net 1781.67 ml            PHYSICAL EXAMINATION:  Right Lower Extremity:    Dorsalis pedis pulses palpable.    Posterior tibial pulses palpable.    Dorsi flexion 5/5.    Plantar flexion 5/5.    Extensor hallucis extension: 5/5.    Sensory intact to light touch L1-S1.    Dressing intact.    Surgical site no drainage.       Problem Review and Assessment: Patient monitored, no new events overnight.    Patient Active Problem List   Diagnosis    Fall, initial encounter    Closed fracture of neck of right femur (Multi)       LABS:  Results for orders placed or performed during the hospital encounter of 08/25/24 (from the past 24 hour(s))   Type and screen   Result Value Ref Range    ABO TYPE O     Rh TYPE POS     ANTIBODY SCREEN NEG    VERIFY ABO/Rh Group Test   Result Value Ref Range    ABO TYPE O     Rh TYPE POS    CBC   Result Value Ref Range    WBC 10.4 4.4 - 11.3 x10*3/uL    nRBC 0.0 0.0 - 0.0 /100 WBCs    RBC 3.66 (L) 4.00 - 5.20 x10*6/uL    Hemoglobin 11.1 (L) 12.0 - 16.0 g/dL    Hematocrit 33.2 (L) 36.0 - 46.0 %    MCV 91 80 - 100 fL    MCH 30.3 26.0 - 34.0 pg    MCHC 33.4 32.0 - 36.0 g/dL    RDW 13.0 11.5 - 14.5 %    Platelets 275 150 - 450 x10*3/uL   Basic metabolic panel   Result Value Ref Range    Glucose 170 (H) 74 - 99 mg/dL    Sodium 135 (L) 136 - 145 mmol/L    " Potassium 3.7 3.5 - 5.3 mmol/L    Chloride 101 98 - 107 mmol/L    Bicarbonate 23 21 - 32 mmol/L    Anion Gap 15 10 - 20 mmol/L    Urea Nitrogen 13 6 - 23 mg/dL    Creatinine 0.73 0.50 - 1.05 mg/dL    eGFR 75 >60 mL/min/1.73m*2    Calcium 8.9 8.6 - 10.3 mg/dL       POST OPERATIVE COMPLICATIONS:  Complicated by: None         ASSESSMENT:   S/P Procedure(s) (LRB):  RIGHT BIPOLAR HEMIARTHROPLASTY (Right) on 8/26/2024    POST-OP PLAN:   Physical Therapy evaluation - WBAT RLE, lateral hip precautions  Ok to DC abduction pillow  DVT prophylaxis - ASA 81mg BID x 28 days , IPDs  Dressing - Mepilex x 7 days  Pain control - Multimodal  Antibiotics - Ancef x 24 hours  Case Management for discharge planning - Plan for discharge to SNF  Follow up outpatient in 2 weeks for repeat radiographs and wound check/staple removal     Woody Castillo, DO  Orthopaedic Surgery  Sports Medicine & Shoulder  [facility]   [phone]

## 2024-08-27 NOTE — PROGRESS NOTES
Social work consult placed for positive medical risk screen and dc planning. SW reviewed pt's chart and communicated with TCC. No SW needs foreseen at this time. SW signing off; available upon request.

## 2024-08-27 NOTE — CONSULTS
"Nutrition Initial Assessment:   Nutrition Assessment    Reason for Assessment: Admission nursing screening    Patient is a 89 y.o. female presenting with fall. Pt unable to provide much nutrition hx.       Nutrition History:  Food and Nutrient History: Pt unable to describe how much she has been eating. No documented meal intakes in EMR. Unable to assess for presence of GI symptoms or difficulty chewing/swallowing.  Vitamin/Herbal Supplement Use: none listed in home med list  Food Allergies/Intolerances:  None  GI Symptoms:  SHAWN  Oral Problems:  SHAWN       Anthropometrics:  Height: 167.6 cm (5' 6\")   Weight: 68 kg (150 lb)   BMI (Calculated): 24.22  IBW/kg (Dietitian Calculated): 59.1 kg          Weight History:   Wt Readings from Last 10 Encounters:   08/25/24 68 kg (150 lb)   8/20/24 60.3 kg ([facility])   12/23/21 60.8 kg ([facility])      Weight Change %:  Weight History / % Weight Change: No evidence of significant wt loss based on available wt records. Pt with 8 kg wt gain x 5 days - although this is unlikely to be true wt gain in short time frame. Suspect error in scales or wt gain from fluid.    Nutrition Focused Physical Exam Findings:    Subcutaneous Fat Loss:   Buccal Fat Pads: Well nourished (full, rounded cheeks)  Triceps: Well nourished (ample fat tissue)  Muscle Wasting:  Temporalis: Mild-Moderate (slight depression)  Pectoralis (Clavicular Region): Mild-Moderate (some protrusion of clavicle)  Deltoid/Trapezius: Mild-Moderate (slight protrusion of acromion process)  Edema:  Edema: +2 mild  Edema Location: RLE per nursing assessment  Physical Findings:  Nails: Negative  Skin: Positive (right hip incision per nursing assessment)    Nutrition Significant Labs:    Reviewed   Nutrition Specific Medications:  Reviewed     I/O:   Last BM Date: 08/25/24;      Dietary Orders (From admission, onward)       Start     Ordered    08/27/24 1106  Oral nutritional supplements  Until discontinued        Comments: chocolate "   Question Answer Comment   Deliver with Lunch    Deliver with Dinner    Select supplement: Magic Cup        08/27/24 1106    08/26/24 1648  Adult diet Regular  Diet effective now        Question:  Diet type  Answer:  Regular    08/26/24 1648                     Estimated Needs:   Total Energy Estimated Needs (kCal): 1700 kCal  Method for Estimating Needs: 25 kcal/kg ABW  Total Protein Estimated Needs (g): 54 g  Method for Estimating Needs: 0.8 g/kg ABW  Total Fluid Estimated Needs (mL): 1700 mL  Method for Estimating Needs: 1 ml/kcal or per MD        Nutrition Diagnosis   Malnutrition Diagnosis  Patient has Malnutrition Diagnosis: No    Nutrition Diagnosis  Patient has Nutrition Diagnosis:  (Unable to determine - possible inadeqaute oral intake)       Nutrition Interventions/Recommendations         Nutrition Prescription:  Individualized Nutrition Prescription Provided for : Regular diet with ONS        Nutrition Interventions:   Interventions: Meals and snacks, Medical food supplement  Meals and Snacks: General healthful diet  Goal: Consumes 3 meals per day  Medical Food Supplement: Commercial beverage  Goal: Magic cup BID (290 kcal, 9 g protein per serving) chocolate until able to better determine adequacy of PO intake         Nutrition Education:   Not appropriate for education       Nutrition Monitoring and Evaluation   Food/Nutrient Related History Monitoring  Monitoring and Evaluation Plan: Energy intake, Amount of food, Fluid intake  Energy Intake: Estimated energy intake  Criteria: Pt meets >75% of estimated energy needs  Fluid Intake: Estimated fluid intake  Criteria: Meets >75% of estimated fluid needs  Amount of Food: Estimated amout of food, Medical food intake  Criteria: Pt consumes >75% of meals and supplements    Body Composition/Growth/Weight History  Monitoring and Evaluation Plan: Weight  Weight: Measured weight  Criteria: Maintains stable weight    Biochemical Data, Medical Tests and  Procedures  Monitoring and Evaluation Plan: Glucose/endocrine profile, Electrolyte/renal panel  Electrolyte and Renal Panel: Sodium, Potassium, Phosphorus  Criteria: Electrolytes WNL  Glucose/Endocrine Profile: Glucose, casual  Criteria: BG within desirable range              Time Spent (min): 45 minutes

## 2024-08-27 NOTE — CARE PLAN
The patient's goals for the shift include      The clinical goals for the shift include Pain management      Problem: Skin  Goal: Prevent/minimize sheer/friction injuries  Outcome: Progressing     Problem: Fall/Injury  Goal: Not fall by end of shift  Outcome: Progressing     Problem: Fall/Injury  Goal: Be free from injury by end of the shift  Outcome: Progressing     Problem: Pain - Adult  Goal: Verbalizes/displays adequate comfort level or baseline comfort level  Outcome: Progressing

## 2024-08-27 NOTE — PROGRESS NOTES
Physical Therapy    Physical Therapy Evaluation    Patient Name: Della Gordillo  MRN: [id number]  Today's Date: 8/27/2024   Time Calculation  Start Time: 0853  Stop Time: 0911  Time Calculation (min): 18 min  201/201-A    Assessment/Plan   PT Assessment  PT Assessment Results: Decreased strength, Decreased endurance, Impaired balance, Decreased mobility  End of Session Communication: Bedside nurse  End of Session Patient Position: Bed, 2 rail up, Alarm on (All needs in reach, hip abd and SCDs in place, no complaints noted)  IP OR SWING BED PT PLAN  Inpatient or Swing Bed: Inpatient  PT Plan  Treatment/Interventions: Bed mobility, Transfer training, Gait training  PT Plan: Ongoing PT  PT Frequency: 4 times per week  PT Discharge Recommendations: Moderate intensity level of continued care  PT - OK to Discharge: Yes    Subjective     Current Problem:  1. Fall, initial encounter        2. Hypokalemia        3. Closed fracture of neck of right femur, initial encounter (Multi)  Case Request Operating Room: Hip Hemiarthroplasty    Case Request Operating Room: Hip Hemiarthroplasty    Surgical Pathology Exam    Surgical Pathology Exam        Patient Active Problem List   Diagnosis    Fall, initial encounter    Closed fracture of neck of right femur (Multi)     General Visit Information:  General  Reason for Referral: PT Eval and Treat  Referred By: Jonathan  Past Medical History Relevant to Rehab: 89 y.o. female POD#1 RIGHT BIPOLAR HEMIARTHROPLASTY s/p mechanical fall.  Co-Treatment: OT  Co-Treatment Reason: to maximize pt safety and function  Prior to Session Communication: Bedside nurse  Patient Position Received: Bed, 2 rail up, Alarm on (Agreeable to PT)    Home Living:  Home Living  Home Living Comments: Pt is from the [facility]    Prior Level of Function:  Prior Function Per Pt/Caregiver Report  Level of [place]:  (Required assist all ADLs & transfers at facility; prior to July 2024, was home with daughter who  assisted with all ADLs & IADLs.)    Precautions:  Precautions  Precautions Comment: R LE WBAT, total hip precautions, fall precautions     Objective     Pain:  Pain Assessment  Pain Assessment:  (8/10 R hip, notified RN and medicated pt during session, repositioned for comfort s/p session)    Cognition:  Cognition  Overall Cognitive Status:  (A & O to person and time; disoriented to place; confused, poot historian regarding baseline functional status, follows directions well with cues, [place], impaired vision per patient report.)    General Assessments:  Sensation  Light Touch: No apparent deficits  Strength  Strength Comments: B LE ROM WFL, unable to formally assess strength 2/2 pain  Static Sitting Balance  Static Sitting-Comment/Number of Minutes: Fair- with CGA     Functional Assessments:  Bed Mobility  Bed Mobility:  (supine <> sitting: dep x 2)  Transfers  Transfer:  (Attempted STS transfer, however pt did not demonstrate any initiation or attempt to WB through either LE despite max A x 2)     Outcome Measures:  [facility] Basic Mobility  Turning from your back to your side while in a flat bed without using bedrails: A lot  Moving from lying on your back to sitting on the side of a flat bed without using bedrails: Total  Moving to and from bed to chair (including a wheelchair): Total  Standing up from a chair using your arms (e.g. wheelchair or bedside chair): Total  To walk in hospital room: Total  Climbing 3-5 steps with railing: Total  Basic Mobility - Total Score: 7     Goals:  Encounter Problems       Encounter Problems (Active)       PT Problem       STG - Pt will transition supine <> sitting with mod A x 2  (Progressing)       Start:  08/27/24    Expected End:  09/10/24            STG - Pt will transfer STS with mod A x 2  (Progressing)       Start:  08/27/24    Expected End:  09/10/24            STG - Pt will amb 3'' using RW with mod A x 2  (Progressing)       Start:  08/27/24    Expected End:  09/10/24                Pain - Adult            Education Documentation  Precautions, taught by Merry aMrt PT at 8/27/2024  9:33 AM.  Learner: Patient  Readiness: Acceptance  Method: Explanation  Response: Verbalizes Understanding, Needs Reinforcement    Mobility Training, taught by Merry Mart PT at 8/27/2024  9:33 AM.  Learner: Patient  Readiness: Acceptance  Method: Explanation  Response: Verbalizes Understanding, Needs Reinforcement    Education Comments  No comments found.

## 2024-08-27 NOTE — PROGRESS NOTES
Della Gordillo is a 89 y.o. female on day 2 of admission presenting with Fall, initial encounter.      Subjective   Patient fully evaluated on 8/27/24. Reviewed labs and medications. On physical exam she was tired and lethargic and using an abductor pillow. Will continue to monitor her and check AM labs. Pt understood and agreeable to plan. I spent 50 minutes in the overall and professional care of this patient.        Objective     Last Recorded Vitals  /77 (BP Location: Right arm, Patient Position: Lying)   Pulse 97   Temp 36.7 °C (98.1 °F) (Temporal)   Resp 18   Wt 68 kg (150 lb)   SpO2 93%   Intake/Output last 3 Shifts:    Intake/Output Summary (Last 24 hours) at 8/27/2024 1411  Last data filed at 8/27/2024 0800  Gross per 24 hour   Intake 1829.17 ml   Output 50 ml   Net 1779.17 ml       Admission Weight  Weight: 68 kg (150 lb) (08/25/24 0622)    Daily Weight  08/25/24 : 68 kg (150 lb)    Image Results  Electrocardiogram, 12-lead PRN ACS symptoms  Sinus rhythm with 1st degree AV block with occasional Premature ventricular complexes  Left bundle branch block  Abnormal ECG  No previous ECGs available      Physical Exam    Relevant Results               Assessment/Plan                  Assessment & Plan  Fall, initial encounter  Acute displaced right femoral neck fracture  Mechanical fall  History of recent L1 compression fracture  Osteoporosis  -Orthopedic surgery consult and recommendations appreciated  -Imaging results as above  -N.p.o. for possible surgical intervention  -Pain control  -Urinalysis ordered  -PT/OT, fall precautions    Elevated high-sensitivity troponins  Hyperlipidemia  Hypertension  -Initial troponin 17, delta troponin 17.  Third ordered, trend  -EKG, per ED physician, shows normal sinus rate and rhythm with frequent PVCs.  No STEMI    Hypokalemia  -Potassium 2.9 on arrival, replacement ordered in the ED  -Monitor with BMP in the a.m.    Aberrant right subclavian artery and ectasia  of the thoracic aorta   -Incidental finding on CT imaging    Hypothyroidism  -N.p.o. for possible surgical intervention, continue home medications when able    DVT prophylaxis  -No chemical anticoagulation due to possible surgical intervention  -SCDs    Closed fracture of neck of right femur (Multi)        Pedro Junior MD  Physician  Internal Medicine     H&P      Addendum     Date of Service: 8/25/2024  9:51 AM     Addendum       Expand All Collapse All    History Of Present Illness  Della Gordillo is a 89 y.o. female with a past medical history of DDD (cervical), hypercholesterolemia, hyperlipidemia, hypothyroidism, osteoporosis, and recent L1 compression fracture who presents to the emergency department today by ambulance from the [facility] for a fall.  Per EMS, they were called to the [facility] this morning after patient was found on the floor out of bed. Upon my arrival, I have just woken patient out of sleep, so she is mildly confused.  She asked me where the dog is.  I believe she was just dreaming, because when I asked her her name, where she was, and what month it was, she answered all 3 questions correctly.  She is hard of hearing.  She tells me this morning that she fell out of bed, but does not know how.  She denies feeling dizzy or passing out before hand.  She does state that she hit her head, but denies LOC and use of blood thinners.  She does endorse a mild cough, but states she is not coughing anything up.  She denies fever/chills, chest pain, shortness of breath, palpitations, leg swelling, nausea/vomiting, diarrhea, urinary symptoms, numbness/tingling.  She states that she takes her medications daily, and that the staff at the [facility] watches her take them.  She states she is eating and drinking appropriately.  She states she uses a wheelchair to get around, but it is kept in a corner of her room that she cannot get to.  I informed her that she has broken her hip, and patient states she does not  want any surgery. Patient has DNRCC form in her nursing home paperwork.     ED course: On arrival, patient's blood pressure 176/117, heart rate 79, respirations 17, afebrile, saturating 96% on room air.  Patient now saturating 91% on room air, BP now 176/92.  Labs and imaging performed, revealing potassium 2.9, initial and delta troponin 17, no leukocytosis or anemia.  CT imaging shows pseudosubluxation of C2 on C3 and C7 on T1.  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  No acute intracranial abnormality.  X-ray of right femur shows acute fracture of the neck of the right femur with foreshortening.  EKG, per ED physician, shows normal sinus rate and rhythm with frequent PVCs.  No STEMI.  Patient given potassium replacement and morphine in the ED.  Patient to be admitted inpatient under Dr. Junior.     Past Medical History  Medical History        Past Medical History:   Diagnosis Date    H/O degenerative disc disease      HLD (hyperlipidemia)      Hypothyroidism      Osteoporosis              Surgical History  Surgical History   History reviewed. No pertinent surgical history.        Social History  She has no history on file for tobacco use, alcohol use, and drug use.     Family History  Family History   No family history on file.        Allergies  Patient has no known allergies.     Review of Systems     Physical Exam  Constitutional:       General: She is not in acute distress.     Appearance: She is not ill-appearing or toxic-appearing.      Comments: Patient mildly confused when I arrive for my interview. I have just woken her out of sleep, she asks me where the dog is. Once she wakes up more, she is able to tell me her name, where she is, and what month it is.    HENT:      Head: Normocephalic and atraumatic.      Nose: Nose normal.      Mouth/Throat:      Mouth: Mucous membranes are dry.      Pharynx: Oropharynx is clear.   Eyes:      Extraocular Movements: Extraocular  "movements intact.      Conjunctiva/sclera: Conjunctivae normal.      Pupils: Pupils are equal, round, and reactive to light.   Cardiovascular:      Rate and Rhythm: Normal rate and regular rhythm.      Pulses: Normal pulses.   Pulmonary:      Effort: Pulmonary effort is normal.      Breath sounds: Normal breath sounds.   Abdominal:      General: Abdomen is flat. Bowel sounds are normal.      Palpations: Abdomen is soft.      Tenderness: There is no abdominal tenderness.   Musculoskeletal:         General: Tenderness (R hip TTP. Patient tells me not to move it.) present.      Cervical back: Tenderness present.      Right lower leg: No edema.      Left lower leg: No edema.   Neurological:      General: No focal deficit present.      Mental Status: She is oriented to person, place, and time.      Sensory: No sensory deficit.      Comments: Neurovascular status intact of BLE.  Strength and sensation intact bilaterally.   Psychiatric:         Mood and Affect: Mood normal.         Behavior: Behavior normal.         Thought Content: Thought content normal.            Last Recorded Vitals  Blood pressure (!) 176/92, pulse 71, temperature 36.4 °C (97.5 °F), resp. rate 16, height 1.676 m (5' 6\"), weight 68 kg (150 lb), SpO2 (!) 91%.     Relevant Results     Scheduled medications    Scheduled Medications   potassium chloride, 20 mEq, oral, Once  potassium chloride, 20 mEq, intravenous, q2h         Continuous medications  Continuous Medications         PRN medications  PRN Medications   PRN medications: ketorolac, polyethylene glycol              Results for orders placed or performed during the hospital encounter of 08/25/24 (from the past 24 hour(s))   CBC and Auto Differential   Result Value Ref Range     WBC 8.7 4.4 - 11.3 x10*3/uL     nRBC 0.0 0.0 - 0.0 /100 WBCs     RBC 4.49 4.00 - 5.20 x10*6/uL     Hemoglobin 13.7 12.0 - 16.0 g/dL     Hematocrit 40.3 36.0 - 46.0 %     MCV 90 80 - 100 fL     MCH 30.5 26.0 - 34.0 pg     " MCHC 34.0 32.0 - 36.0 g/dL     RDW 12.8 11.5 - 14.5 %     Platelets 321 150 - 450 x10*3/uL     Neutrophils % 61.4 40.0 - 80.0 %     Immature Granulocytes %, Automated 0.8 0.0 - 0.9 %     Lymphocytes % 25.3 13.0 - 44.0 %     Monocytes % 9.4 2.0 - 10.0 %     Eosinophils % 2.8 0.0 - 6.0 %     Basophils % 0.3 0.0 - 2.0 %     Neutrophils Absolute 5.33 1.60 - 5.50 x10*3/uL     Immature Granulocytes Absolute, Automated 0.07 0.00 - 0.50 x10*3/uL     Lymphocytes Absolute 2.20 0.80 - 3.00 x10*3/uL     Monocytes Absolute 0.82 (H) 0.05 - 0.80 x10*3/uL     Eosinophils Absolute 0.24 0.00 - 0.40 x10*3/uL     Basophils Absolute 0.03 0.00 - 0.10 x10*3/uL   Comprehensive metabolic panel   Result Value Ref Range     Glucose 157 (H) 74 - 99 mg/dL     Sodium 140 136 - 145 mmol/L     Potassium 2.9 (LL) 3.5 - 5.3 mmol/L     Chloride 103 98 - 107 mmol/L     Bicarbonate 23 21 - 32 mmol/L     Anion Gap 17 10 - 20 mmol/L     Urea Nitrogen 7 6 - 23 mg/dL     Creatinine 0.63 0.50 - 1.05 mg/dL     eGFR 81 >60 mL/min/1.73m*2     Calcium 9.1 8.6 - 10.3 mg/dL     Albumin 3.9 3.4 - 5.0 g/dL     Alkaline Phosphatase 119 33 - 136 U/L     Total Protein 7.1 6.4 - 8.2 g/dL     AST 18 9 - 39 U/L     Bilirubin, Total 1.3 (H) 0.0 - 1.2 mg/dL     ALT 4 (L) 7 - 45 U/L   Magnesium   Result Value Ref Range     Magnesium 1.70 1.60 - 2.40 mg/dL   Troponin I, High Sensitivity   Result Value Ref Range     Troponin I, High Sensitivity 17 (H) 0 - 13 ng/L   Troponin I, High Sensitivity   Result Value Ref Range     Troponin I, High Sensitivity 17 (H) 0 - 13 ng/L      XR pelvis 1-2 views     Result Date: 8/25/2024  Interpreted By:  Amada Chagn, STUDY: XR PELVIS 1-2 VIEWS; ;  8/25/2024 8:12 am   INDICATION: Signs/Symptoms:right hip/upper femur pain.   COMPARISON: None.   ACCESSION NUMBER(S): [id number]   ORDERING CLINICIAN: ALLISON NEIL   FINDINGS: AP view of the pelvis shows an acute right femoral neck fracture with foreshortening. The remaining bones and joints  appear intact        Acute displaced right femoral neck fracture     MACRO: None   Signed by: Amada Chang 8/25/2024 8:29 AM Dictation workstation:   [id number]     XR femur right 2+ views     Result Date: 8/25/2024  Interpreted By:  Amada Chang, STUDY: XR FEMUR RIGHT 2+ VIEWS; ;  8/25/2024 8:12 am   INDICATION: Signs/Symptoms:fall/tender proximal.   COMPARISON: None.   ACCESSION NUMBER(S): [id number]   ORDERING CLINICIAN: CHIKA MARTINEZ   FINDINGS: Two views right femur   There is a fracture through the right femoral neck with foreshortening. Degenerative changes are seen on the greater trochanter. Sclerosis of the distal right femur probably represents a bone infarct.        Acute fracture of the neck of the right femur with foreshortening     MACRO: None   Signed by: Amada Chang 8/25/2024 8:28 AM Dictation workstation:   [id number]     XR chest 1 view     Result Date: 8/25/2024  Interpreted By:  Amada Chang, STUDY: XR CHEST 1 VIEW 8/25/2024 8:12 am   INDICATION: Signs/Symptoms:fall   COMPARISON: None   ACCESSION NUMBER(S): [id number]   ORDERING CLINICIAN: CHIKA MARTINEZ   TECHNIQUE: AP view   FINDINGS: Is enlargement of the cardiac silhouette. Pulmonary vascularity is normal. No infiltrates. No pneumothorax or pleural effusion. Healed right rib fractures are noted        No acute abnormalities   Signed by: Amada Chang 8/25/2024 8:27 AM Dictation workstation:   [id number]     CT cervical spine wo IV contrast     Result Date: 8/25/2024  Interpreted By:  Amada Chang, STUDY: CT CERVICAL SPINE WO IV CONTRAST;  8/25/2024 7:00 am   INDICATION: Signs/Symptoms:fall.   COMPARISON: None.   ACCESSION NUMBER(S): [id number]   ORDERING CLINICIAN: CHIKA MARTINEZ   TECHNIQUE: Axial CT images of the cervical spine are obtained. Axial, coronal and sagittal reconstructions are provided for review.   All CT exams are performed with 1 or more of the following dose reduction techniques: Automatic exposure control,  adjustment of mA and/or kv according to patient size, or use of iterative reconstruction techniques.   FINDINGS: There is straightening of the cervical lordosis. Multilevel disc space narrowing and spurring noted with facet and uncovertebral joint hypertrophy. Arthritic pseudo subluxation of C2 on C3 and C7 on T1 noted bony encroachment on neural foramina due to uncovertebral joint hypertrophy most severe involving the right C6-7 foramen.   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 Aortic arch has short axis measurement of 3.3 cm          1. No acute fracture or traumatic subluxation 2. Multilevel cervical spondylosis   MACRO: None   Signed by: Amada Chang 8/25/2024 8:10 AM Dictation workstation:   [id number]     CT thoracic spine wo IV contrast     Result Date: 8/25/2024  Interpreted By:  Amdaa Chang, STUDY: CT THORACIC SPINE WO IV CONTRAST;  8/25/2024 7:00 am   INDICATION: Signs/Symptoms:fall.   COMPARISON: None.   ACCESSION NUMBER(S): [id number]   ORDERING CLINICIAN: CHIKA MARTINEZ   TECHNIQUE: Axial CT images of the thoracic spine are obtained. Axial, coronal and sagittal reconstructions are submitted for review.   FINDINGS: There is an acute compression fracture of the superior endplate of L1 with minimal retropulsion of the posterosuperior endplate.   The remaining thoracic vertebral body heights are maintained. Alignment is satisfactory. Disc space narrowing and spurring is seen throughout the thoracic spine. No bony canal stenosis or bony foraminal stenosis noted.   Incidental note is made of an aberrant right subclavian artery. Ectasia of the ascending thoracic aorta measures 3.9 cm short axis. Mild cardiomegaly with mild atherosclerotic coronary artery calcifications. Coarsened interstitial markings in the lung bases probably due to chronic change mild scoliosis noted.          1. Diffuse thoracic spondylosis and mild scoliosis. No acute bony abnormalities in the  thoracic spine. 2. Acute compression fracture of L1     MACRO: None   Signed by: Amada Chang 8/25/2024 8:04 AM Dictation workstation:   [id number]     CT lumbar spine wo IV contrast     Result Date: 8/25/2024  Interpreted By:  Amada Chang, STUDY: CT LUMBAR SPINE WO IV CONTRAST  8/25/2024 7:00 am   INDICATION: Signs/Symptoms:fall, history of recent lumbar fracture   COMPARISON: None.   ACCESSION NUMBER(S): [id number]   ORDERING CLINICIAN: CHIKA MARTINEZ   TECHNIQUE: Axial CT images of the lumbar spine are obtained. Axial, coronal and sagittal reconstructions are provided for review.   All CT exams are performed with 1 or more of the following dose reduction techniques: Automatic exposure control, adjustment of mA and/or kv according to patient size, or use of iterative reconstruction techniques.   FINDINGS: T12-L1: There is an acute compression fracture of the superior endplate of L1 with 2 mm of retropulsion of the posterosuperior endplate. Minimal bony canal stenosis. No foraminal stenosis.   L1-2: There is a chronic appearing compression fracture of the superior endplate of L2. There is a broad-based disc bulge. There is no bony canal or bony foraminal stenosis. Schmorl's node also noted in the superior endplate of L2.   L2-3: Small posterior osteophyte disc complex effaces the ventral thecal sac. Posterior element hypertrophy is noted. There is lumbar canal stenosis. No bony foraminal stenosis.   L3-4: There is 2 mm of anterior subluxation of L3 on L4 with a broad-based disc bulge and posterior element hypertrophy. Canal stenosis due to degenerative changes. No bony foraminal stenosis   L4-5: Slight disc space narrowing with broad-based disc bulge and posterior element hypertrophy. Canal stenosis noted due to degenerative changes.   L5-S1: Broad-based disc bulge. No bony canal or bony foraminal stenosis.   Additional findings include hyperdense material layering in the gallbladder and colonic  diverticulosis.        1. Acute compression fracture of L1 with 2 mm of retropulsion of the posterosuperior endplate causing minimal bony canal stenosis. 2. Remote appearing L2 compression fracture 3. Multilevel lumbar spondylosis   MACRO: None   Signed by: Amada Chang 8/25/2024 8:00 AM Dictation workstation:   [id number]     CT head wo IV contrast     Result Date: 8/25/2024  Interpreted By:  Amada Chang, STUDY: CT HEAD WO IV CONTRAST;  8/25/2024 7:00 am   INDICATION: Signs/Symptoms:fall.   COMPARISON: None..   ACCESSION NUMBER(S): [id number]   ORDERING CLINICIAN: CHIKA MARTINEZ   TECHNIQUE: CT axial images through the Brain were obtained without contrast.   All CT exams are performed with 1 or more of the following dose reduction techniques: Automatic exposure control, adjustment of mA and/or kv according to patient size, or use of iterative reconstruction techniques.   FINDINGS: There is mass effect or acute intracranial hemorrhage. Tiny remote left basal ganglia lacunar infarct. Ventricles are enlarged likely related to diffuse cerebral atrophy. Gray-white differentiation is maintained.   Paranasal sinus mucosal thickening noted.. Lens replacements are seen.        No acute intracranial abnormality.   MACRO: None.   Signed by: Amada Chang 8/25/2024 7:54 AM Dictation workstation:   [id number]            Assessment/Plan        Assessment & Plan  Fall, initial encounter  Acute displaced right femoral neck fracture  Mechanical fall  History of recent L1 compression fracture  Osteoporosis  -Orthopedic surgery consult and recommendations appreciated  -Imaging results as above  -N.p.o. for possible surgical intervention  -Pain control  -Urinalysis ordered  -PT/OT, fall precautions     Elevated high-sensitivity troponins  Hyperlipidemia  Hypertension  -Initial troponin 17, delta troponin 17.  Third ordered, trend  -EKG, per ED physician, shows normal sinus rate and rhythm with frequent PVCs.  No STEMI      Hypokalemia  -Potassium 2.9 on arrival, replacement ordered in the ED  -Monitor with BMP in the a.m.     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 for possible surgical intervention, continue home medications when able     DVT prophylaxis  -No chemical anticoagulation due to possible surgical intervention  -SCDs     Patient fully evaluated on August 25.  Critical correct electrolyte disturbances before surgical intervention.     I spent 45 minutes in the professional and overall care of this patient.                      Revision History       Patient fully evaluated on 8/27/24. Reviewed labs and medications. On physical exam she was tired and lethargic and using an abductor pillow. Will continue to monitor her and check AM labs. Pt understood and agreeable to plan. I spent 50 minutes in the overall and professional care of this patient.               SYED SALGUERO

## 2024-08-27 NOTE — PROGRESS NOTES
Occupational Therapy    Evaluation    Patient Name: Della Gordillo  MRN: [id number]  Today's Date: 8/27/2024  Time Calculation  Start Time: 0852  Stop Time: 0911  Time Calculation (min): 19 min    Assessment  IP OT Assessment  OT Assessment: Patient requires extensive assist for all care and would benefit from 24 hour support and O.T. services at a moderate intensity to improve independence with ADLs, transfers & mobility.  Prognosis: Good  End of Session Communication: Bedside nurse  End of Session Patient Position: Bed, 2 rail up, Alarm on (abd wedge in place, call light in reach)    Plan:  Treatment Interventions: ADL retraining, Functional transfer training, UE strengthening/ROM, Endurance training, Neuromuscular reeducation, Compensatory technique education  OT Frequency: 4 times per week  OT Discharge Recommendations: Moderate intensity level of continued care, 24 hr supervision due to cognition  OT - OK to Discharge: Yes (from an O.T. standpoint)    Subjective     Current Problem:  Patient Active Problem List   Diagnosis    Fall, initial encounter    Closed fracture of neck of right femur (Multi)       General:  General  Reason for Referral: OT eval & treat s/p recent surgery (8/26/24: R bipolar hemiarthroplasty)  Referred By: Woody Castillo DO  Past Medical History Relevant to Rehab: 8/25/24: fall with acute displaced R femoral neck fracture.  Recent fall July 2024 with L1 fracture.  PMH: hyperlipidemia, osteoporosis, DDD cervical spine,  Co-Treatment: PT  Co-Treatment Reason: to maximize patient safety & mobility  Prior to Session Communication: Bedside nurse  Patient Position Received: Bed, 2 rail up, Alarm on  General Comment: Per conference with RN, patient is medically stable for therapy eval    Precautions:  Precautions Comment: WBAT, THR, I & O, hearing impairement, visual impairment       Pain:  Pain Assessment  Pain Assessment: 0-10  0-10 (Numeric) Pain Score: 8  Pain Type: Surgical pain  Pain Location:  Hip  Pain Orientation: Right  Pain Interventions: Repositioned (RN notified, came into room to medicate patient for pain;)    Objective     Cognition:  Overall Cognitive Status:  (A & O to person and time; disoriented to place; confused, poot historian regarding baseline functional status, follows directions well with cues, [place], impaired vision per patient report.)        Home Living:  Home Living Comments: Per EMR, patient admitted from skilled facility s/p fall; Required assist all ADLs & transfers at facility; prior to July 2024, was home with daughter who assisted with all ADLs & IADLs.       ADL:  Grooming Assistance: Stand by  UE Dressing Assistance: Minimal  LE Dressing Assistance: Total  Toileting Assistance with Device: Total  ADL Comments: will benefit from adaptive equipment for LE ADLs secondary to THR precautions,    Activity Tolerance:  Endurance: Decreased tolerance for upright activites    Bed Mobility/Transfers:   Bed Mobility  Bed Mobility:  (total assist x 2 supine <-> sit with draw sheet)  Transfers  Transfer:  (attempted sit to stand from elevated bed, unable despite max assist x 2; patient not putting weight through RLE)    Sitting Balance:  Static Sitting Balance  Static Sitting-Level of Assistance: Contact guard    Sensation:  Light Touch: No apparent deficits    Strength:  Strength Comments: BUE grossly 4-/5    Coordination:  Movements are Fluid and Coordinated: Yes       Outcome Measures: [facility] Daily Activity  Putting on and taking off regular lower body clothing: Total  Bathing (including washing, rinsing, drying): A lot  Putting on and taking off regular upper body clothing: A little  Toileting, which includes using toilet, bedpan or urinal: Total  Taking care of personal grooming such as brushing teeth: A little  Eating Meals: A little  Daily Activity - Total Score: 13               EDUCATION:     Education Documentation  ADL Training, taught by Lisette Galloway OT at 8/27/2024  9:32  AM.  Learner: Patient  Readiness: Acceptance  Method: Explanation, Demonstration  Response: Needs Reinforcement    Education Comments  No comments found.        Goals:   Encounter Problems       Encounter Problems (Active)       OT Goals       Increase UE dressing and grooming to SBA; increase LE dressing to max assist with adaptive equipment.  (Progressing)       Start:  08/27/24    Expected End:  09/10/24            Increase bed mobility to mod assist with rails for safety (Progressing)       Start:  08/27/24    Expected End:  09/10/24            Increase functional transfers to/from bed, chair & commode to mod assist x 2 with DME for safety (Progressing)       Start:  08/27/24    Expected End:  09/10/24            Demonstrate compliance to post op precautions and walker safety techs with min cues during ADLs/functional transfers (Progressing)       Start:  08/27/24    Expected End:  09/10/24

## 2024-08-28 VITALS
WEIGHT: 150 LBS | RESPIRATION RATE: 18 BRPM | SYSTOLIC BLOOD PRESSURE: 115 MMHG | TEMPERATURE: 99.1 F | HEIGHT: 66 IN | DIASTOLIC BLOOD PRESSURE: 61 MMHG | BODY MASS INDEX: 24.11 KG/M2 | OXYGEN SATURATION: 92 % | HEART RATE: 90 BPM

## 2024-08-28 LAB
ALBUMIN SERPL BCP-MCNC: 3.1 G/DL (ref 3.4–5)
ALP SERPL-CCNC: 71 U/L (ref 33–136)
ALT SERPL W P-5'-P-CCNC: 4 U/L (ref 7–45)
ANION GAP SERPL CALC-SCNC: 12 MMOL/L (ref 10–20)
AST SERPL W P-5'-P-CCNC: 20 U/L (ref 9–39)
ATRIAL RATE: 76 BPM
BASOPHILS # BLD AUTO: 0.01 X10*3/UL (ref 0–0.1)
BASOPHILS NFR BLD AUTO: 0.1 %
BILIRUB SERPL-MCNC: 1.8 MG/DL (ref 0–1.2)
BUN SERPL-MCNC: 15 MG/DL (ref 6–23)
CALCIUM SERPL-MCNC: 8.8 MG/DL (ref 8.6–10.3)
CHLORIDE SERPL-SCNC: 101 MMOL/L (ref 98–107)
CO2 SERPL-SCNC: 26 MMOL/L (ref 21–32)
CREAT SERPL-MCNC: 0.71 MG/DL (ref 0.5–1.05)
EGFRCR SERPLBLD CKD-EPI 2021: 77 ML/MIN/1.73M*2
EOSINOPHIL # BLD AUTO: 0.02 X10*3/UL (ref 0–0.4)
EOSINOPHIL NFR BLD AUTO: 0.2 %
ERYTHROCYTE [DISTWIDTH] IN BLOOD BY AUTOMATED COUNT: 12.9 % (ref 11.5–14.5)
GLUCOSE SERPL-MCNC: 114 MG/DL (ref 74–99)
HCT VFR BLD AUTO: 30.6 % (ref 36–46)
HGB BLD-MCNC: 10.2 G/DL (ref 12–16)
IMM GRANULOCYTES # BLD AUTO: 0.08 X10*3/UL (ref 0–0.5)
IMM GRANULOCYTES NFR BLD AUTO: 0.9 % (ref 0–0.9)
LYMPHOCYTES # BLD AUTO: 1.58 X10*3/UL (ref 0.8–3)
LYMPHOCYTES NFR BLD AUTO: 17.1 %
MCH RBC QN AUTO: 30.4 PG (ref 26–34)
MCHC RBC AUTO-ENTMCNC: 33.3 G/DL (ref 32–36)
MCV RBC AUTO: 91 FL (ref 80–100)
MONOCYTES # BLD AUTO: 1.2 X10*3/UL (ref 0.05–0.8)
MONOCYTES NFR BLD AUTO: 13 %
NEUTROPHILS # BLD AUTO: 6.37 X10*3/UL (ref 1.6–5.5)
NEUTROPHILS NFR BLD AUTO: 68.7 %
NRBC BLD-RTO: 0 /100 WBCS (ref 0–0)
P AXIS: 32 DEGREES
P OFFSET: 156 MS
P ONSET: 100 MS
PLATELET # BLD AUTO: 244 X10*3/UL (ref 150–450)
POTASSIUM SERPL-SCNC: 3.5 MMOL/L (ref 3.5–5.3)
PR INTERVAL: 226 MS
PROT SERPL-MCNC: 5.7 G/DL (ref 6.4–8.2)
Q ONSET: 213 MS
QRS COUNT: 13 BEATS
QRS DURATION: 136 MS
QT INTERVAL: 438 MS
QTC CALCULATION(BAZETT): 492 MS
QTC FREDERICIA: 473 MS
R AXIS: -25 DEGREES
RBC # BLD AUTO: 3.35 X10*6/UL (ref 4–5.2)
SARS-COV-2 RNA RESP QL NAA+PROBE: NOT DETECTED
SODIUM SERPL-SCNC: 135 MMOL/L (ref 136–145)
T AXIS: 107 DEGREES
T OFFSET: 432 MS
VENTRICULAR RATE: 76 BPM
WBC # BLD AUTO: 9.3 X10*3/UL (ref 4.4–11.3)

## 2024-08-28 PROCEDURE — 85025 COMPLETE CBC W/AUTO DIFF WBC: CPT | Performed by: INTERNAL MEDICINE

## 2024-08-28 PROCEDURE — 84075 ASSAY ALKALINE PHOSPHATASE: CPT | Performed by: INTERNAL MEDICINE

## 2024-08-28 PROCEDURE — 2500000004 HC RX 250 GENERAL PHARMACY W/ HCPCS (ALT 636 FOR OP/ED): Performed by: STUDENT IN AN ORGANIZED HEALTH CARE EDUCATION/TRAINING PROGRAM

## 2024-08-28 PROCEDURE — 87635 SARS-COV-2 COVID-19 AMP PRB: CPT | Performed by: INTERNAL MEDICINE

## 2024-08-28 PROCEDURE — 97535 SELF CARE MNGMENT TRAINING: CPT | Mod: CO,GO

## 2024-08-28 PROCEDURE — 36415 COLL VENOUS BLD VENIPUNCTURE: CPT | Performed by: INTERNAL MEDICINE

## 2024-08-28 PROCEDURE — 2500000001 HC RX 250 WO HCPCS SELF ADMINISTERED DRUGS (ALT 637 FOR MEDICARE OP): Performed by: STUDENT IN AN ORGANIZED HEALTH CARE EDUCATION/TRAINING PROGRAM

## 2024-08-28 PROCEDURE — 97535 SELF CARE MNGMENT TRAINING: CPT | Mod: CQ,GP

## 2024-08-28 RX ORDER — ASPIRIN 81 MG/1
81 TABLET ORAL 2 TIMES DAILY
Qty: 53 TABLET | Refills: 0 | Status: SHIPPED | OUTPATIENT
Start: 2024-08-28 | End: 2024-09-24

## 2024-08-28 RX ORDER — CYCLOBENZAPRINE HCL 10 MG
10 TABLET ORAL 3 TIMES DAILY PRN
Start: 2024-08-28

## 2024-08-28 RX ORDER — ACETAMINOPHEN 500 MG
5 TABLET ORAL NIGHTLY PRN
Start: 2024-08-28

## 2024-08-28 RX ORDER — TRAMADOL HYDROCHLORIDE 50 MG/1
50 TABLET ORAL EVERY 8 HOURS PRN
Start: 2024-08-28

## 2024-08-28 ASSESSMENT — COGNITIVE AND FUNCTIONAL STATUS - GENERAL
MOBILITY SCORE: 9
DRESSING REGULAR UPPER BODY CLOTHING: A LOT
EATING MEALS: A LITTLE
HELP NEEDED FOR BATHING: A LOT
TURNING FROM BACK TO SIDE WHILE IN FLAT BAD: A LOT
TOILETING: TOTAL
WALKING IN HOSPITAL ROOM: TOTAL
STANDING UP FROM CHAIR USING ARMS: A LOT
MOVING TO AND FROM BED TO CHAIR: TOTAL
DRESSING REGULAR LOWER BODY CLOTHING: TOTAL
MOVING FROM LYING ON BACK TO SITTING ON SIDE OF FLAT BED WITH BEDRAILS: A LOT
CLIMB 3 TO 5 STEPS WITH RAILING: TOTAL
DAILY ACTIVITIY SCORE: 12
PERSONAL GROOMING: A LITTLE

## 2024-08-28 ASSESSMENT — PAIN SCALES - PAIN ASSESSMENT IN ADVANCED DEMENTIA (PAINAD)
NEGVOCALIZATION: OCCASIONAL MOAN/GROAN, LOW SPEECH, NEGATIVE/DISAPPROVING QUALITY
FACIALEXPRESSION: SMILING OR INEXPRESSIVE
TOTALSCORE: 2
BODYLANGUAGE: TENSE, DISTRESSED PACING, FIDGETING
TOTALSCORE: MEDICATION (SEE MAR)
BREATHING: NORMAL
CONSOLABILITY: NO NEED TO CONSOLE

## 2024-08-28 ASSESSMENT — ACTIVITIES OF DAILY LIVING (ADL)
HOME_MANAGEMENT_TIME_ENTRY: 15
BATHING_LEVEL_OF_ASSISTANCE: DEPENDENT

## 2024-08-28 ASSESSMENT — PAIN SCALES - GENERAL: PAINLEVEL_OUTOF10: 0 - NO PAIN

## 2024-08-28 NOTE — DISCHARGE SUMMARY
Discharge Diagnosis  Fall, initial encounter    Issues Requiring Follow-Up    Patient fully evaluated 8/28, significant clinical improvement noted, patient medically cleared for discharge today. Patient alert, awake, and smiling in bed, denies acute difficulties at this time, medications and labs reviewed, continue current and repeat labs in the AM if patient still hospitalized. Patient aware and agreeable to current plan, continue plan as above. I spent 30 minutes in the professional and overall care of this patient.    Discharge Meds     Your medication list        START taking these medications        Instructions Last Dose Given Next Dose Due   aspirin 81 mg EC tablet      Take 1 tablet (81 mg) by mouth 2 times a day for 53 doses.       cyclobenzaprine 10 mg tablet  Commonly known as: Flexeril      Take 1 tablet (10 mg) by mouth 3 times a day as needed for muscle spasms.       melatonin 5 mg tablet      Take 1 tablet (5 mg) by mouth as needed at bedtime for sleep.       traMADol 50 mg tablet  Commonly known as: Ultram      Take 1 tablet (50 mg) by mouth every 8 hours if needed for moderate pain (4 - 6).              CONTINUE taking these medications        Instructions Last Dose Given Next Dose Due   acetaminophen 325 mg tablet  Commonly known as: Tylenol           Synthroid 50 mcg tablet  Generic drug: levothyroxine                     Where to Get Your Medications        These medications were sent to [facility]  [address] [place] [address]      Hours: 8:30 AM to 5:00 PM Mon - Fri Phone: [phone]   aspirin 81 mg EC tablet       Information about where to get these medications is not yet available    Ask your nurse or doctor about these medications  cyclobenzaprine 10 mg tablet  melatonin 5 mg tablet  traMADol 50 mg tablet         Test Results Pending At Discharge  Pending Labs       Order Current Status    Surgical Pathology Exam In process            Hospital Course          Pedro Junior MD   Physician  Internal Medicine     Progress Notes      Signed     Date of Service: 8/27/2024  2:11 PM     Signed       Expand All Collapse All    Della Gordillo is a 89 y.o. female on day 2 of admission presenting with Fall, initial encounter.           Subjective  Patient fully evaluated on 8/27/24. Reviewed labs and medications. On physical exam she was tired and lethargic and using an abductor pillow. Will continue to monitor her and check AM labs. Pt understood and agreeable to plan. I spent 50 minutes in the overall and professional care of this patient.               Objective  Last Recorded Vitals  /77 (BP Location: Right arm, Patient Position: Lying)   Pulse 97   Temp 36.7 °C (98.1 °F) (Temporal)   Resp 18   Wt 68 kg (150 lb)   SpO2 93%   Intake/Output last 3 Shifts:     Intake/Output Summary (Last 24 hours) at 8/27/2024 1411  Last data filed at 8/27/2024 0800      Gross per 24 hour   Intake 1829.17 ml   Output 50 ml   Net 1779.17 ml         Admission Weight  Weight: 68 kg (150 lb) (08/25/24 0622)     Daily Weight  08/25/24 : 68 kg (150 lb)     Image Results  Electrocardiogram, 12-lead PRN ACS symptoms  Sinus rhythm with 1st degree AV block with occasional Premature ventricular complexes  Left bundle branch block  Abnormal ECG  No previous ECGs available        Physical Exam     Relevant Results                       Assessment/Plan                          Assessment & Plan  Fall, initial encounter  Acute displaced right femoral neck fracture  Mechanical fall  History of recent L1 compression fracture  Osteoporosis  -Orthopedic surgery consult and recommendations appreciated  -Imaging results as above  -N.p.o. for possible surgical intervention  -Pain control  -Urinalysis ordered  -PT/OT, fall precautions     Elevated high-sensitivity troponins  Hyperlipidemia  Hypertension  -Initial troponin 17, delta troponin 17.  Third ordered, trend  -EKG, per ED physician, shows normal  sinus rate and rhythm with frequent PVCs.  No STEMI     Hypokalemia  -Potassium 2.9 on arrival, replacement ordered in the ED  -Monitor with BMP in the a.m.     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 for possible surgical intervention, continue home medications when able     DVT prophylaxis  -No chemical anticoagulation due to possible surgical intervention  -SCDs     Closed fracture of neck of right femur (Multi)        Pedro Junior MD  Physician  Internal Medicine     H&P      Addendum     Date of Service: 8/25/2024  9:51 AM      Addendum        Expand All Collapse All    History Of Present Illness  Della Gordillo is a 89 y.o. female with a past medical history of DDD (cervical), hypercholesterolemia, hyperlipidemia, hypothyroidism, osteoporosis, and recent L1 compression fracture who presents to the emergency department today by ambulance from the [facility] for a fall.  Per EMS, they were called to the [facility] this morning after patient was found on the floor out of bed. Upon my arrival, I have just woken patient out of sleep, so she is mildly confused.  She asked me where the dog is.  I believe she was just dreaming, because when I asked her her name, where she was, and what month it was, she answered all 3 questions correctly.  She is hard of hearing.  She tells me this morning that she fell out of bed, but does not know how.  She denies feeling dizzy or passing out before hand.  She does state that she hit her head, but denies LOC and use of blood thinners.  She does endorse a mild cough, but states she is not coughing anything up.  She denies fever/chills, chest pain, shortness of breath, palpitations, leg swelling, nausea/vomiting, diarrhea, urinary symptoms, numbness/tingling.  She states that she takes her medications daily, and that the staff at the [facility] watches her take them.  She states she is eating and  drinking appropriately.  She states she uses a wheelchair to get around, but it is kept in a corner of her room that she cannot get to.  I informed her that she has broken her hip, and patient states she does not want any surgery. Patient has DNRCC form in her nursing home paperwork.     ED course: On arrival, patient's blood pressure 176/117, heart rate 79, respirations 17, afebrile, saturating 96% on room air.  Patient now saturating 91% on room air, BP now 176/92.  Labs and imaging performed, revealing potassium 2.9, initial and delta troponin 17, no leukocytosis or anemia.  CT imaging shows pseudosubluxation of C2 on C3 and C7 on T1.  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  No acute intracranial abnormality.  X-ray of right femur shows acute fracture of the neck of the right femur with foreshortening.  EKG, per ED physician, shows normal sinus rate and rhythm with frequent PVCs.  No STEMI.  Patient given potassium replacement and morphine in the ED.  Patient to be admitted inpatient under Dr. Juinor.     Past Medical History  Medical History           Past Medical History:   Diagnosis Date    H/O degenerative disc disease      HLD (hyperlipidemia)      Hypothyroidism      Osteoporosis              Surgical History  Surgical History   History reviewed. No pertinent surgical history.         Social History  She has no history on file for tobacco use, alcohol use, and drug use.     Family History  Family History   No family history on file.         Allergies  Patient has no known allergies.     Review of Systems     Physical Exam  Constitutional:       General: She is not in acute distress.     Appearance: She is not ill-appearing or toxic-appearing.      Comments: Patient mildly confused when I arrive for my interview. I have just woken her out of sleep, she asks me where the dog is. Once she wakes up more, she is able to tell me her name, where she is, and what month it is.   "  HENT:      Head: Normocephalic and atraumatic.      Nose: Nose normal.      Mouth/Throat:      Mouth: Mucous membranes are dry.      Pharynx: Oropharynx is clear.   Eyes:      Extraocular Movements: Extraocular movements intact.      Conjunctiva/sclera: Conjunctivae normal.      Pupils: Pupils are equal, round, and reactive to light.   Cardiovascular:      Rate and Rhythm: Normal rate and regular rhythm.      Pulses: Normal pulses.   Pulmonary:      Effort: Pulmonary effort is normal.      Breath sounds: Normal breath sounds.   Abdominal:      General: Abdomen is flat. Bowel sounds are normal.      Palpations: Abdomen is soft.      Tenderness: There is no abdominal tenderness.   Musculoskeletal:         General: Tenderness (R hip TTP. Patient tells me not to move it.) present.      Cervical back: Tenderness present.      Right lower leg: No edema.      Left lower leg: No edema.   Neurological:      General: No focal deficit present.      Mental Status: She is oriented to person, place, and time.      Sensory: No sensory deficit.      Comments: Neurovascular status intact of BLE.  Strength and sensation intact bilaterally.   Psychiatric:         Mood and Affect: Mood normal.         Behavior: Behavior normal.         Thought Content: Thought content normal.            Last Recorded Vitals  Blood pressure (!) 176/92, pulse 71, temperature 36.4 °C (97.5 °F), resp. rate 16, height 1.676 m (5' 6\"), weight 68 kg (150 lb), SpO2 (!) 91%.     Relevant Results     Scheduled medications     Scheduled Medications   potassium chloride, 20 mEq, oral, Once  potassium chloride, 20 mEq, intravenous, q2h         Continuous medications  Continuous Medications          PRN medications  PRN Medications   PRN medications: ketorolac, polyethylene glycol                   Results for orders placed or performed during the hospital encounter of 08/25/24 (from the past 24 hour(s))   CBC and Auto Differential   Result Value Ref Range     WBC " 8.7 4.4 - 11.3 x10*3/uL     nRBC 0.0 0.0 - 0.0 /100 WBCs     RBC 4.49 4.00 - 5.20 x10*6/uL     Hemoglobin 13.7 12.0 - 16.0 g/dL     Hematocrit 40.3 36.0 - 46.0 %     MCV 90 80 - 100 fL     MCH 30.5 26.0 - 34.0 pg     MCHC 34.0 32.0 - 36.0 g/dL     RDW 12.8 11.5 - 14.5 %     Platelets 321 150 - 450 x10*3/uL     Neutrophils % 61.4 40.0 - 80.0 %     Immature Granulocytes %, Automated 0.8 0.0 - 0.9 %     Lymphocytes % 25.3 13.0 - 44.0 %     Monocytes % 9.4 2.0 - 10.0 %     Eosinophils % 2.8 0.0 - 6.0 %     Basophils % 0.3 0.0 - 2.0 %     Neutrophils Absolute 5.33 1.60 - 5.50 x10*3/uL     Immature Granulocytes Absolute, Automated 0.07 0.00 - 0.50 x10*3/uL     Lymphocytes Absolute 2.20 0.80 - 3.00 x10*3/uL     Monocytes Absolute 0.82 (H) 0.05 - 0.80 x10*3/uL     Eosinophils Absolute 0.24 0.00 - 0.40 x10*3/uL     Basophils Absolute 0.03 0.00 - 0.10 x10*3/uL   Comprehensive metabolic panel   Result Value Ref Range     Glucose 157 (H) 74 - 99 mg/dL     Sodium 140 136 - 145 mmol/L     Potassium 2.9 (LL) 3.5 - 5.3 mmol/L     Chloride 103 98 - 107 mmol/L     Bicarbonate 23 21 - 32 mmol/L     Anion Gap 17 10 - 20 mmol/L     Urea Nitrogen 7 6 - 23 mg/dL     Creatinine 0.63 0.50 - 1.05 mg/dL     eGFR 81 >60 mL/min/1.73m*2     Calcium 9.1 8.6 - 10.3 mg/dL     Albumin 3.9 3.4 - 5.0 g/dL     Alkaline Phosphatase 119 33 - 136 U/L     Total Protein 7.1 6.4 - 8.2 g/dL     AST 18 9 - 39 U/L     Bilirubin, Total 1.3 (H) 0.0 - 1.2 mg/dL     ALT 4 (L) 7 - 45 U/L   Magnesium   Result Value Ref Range     Magnesium 1.70 1.60 - 2.40 mg/dL   Troponin I, High Sensitivity   Result Value Ref Range     Troponin I, High Sensitivity 17 (H) 0 - 13 ng/L   Troponin I, High Sensitivity   Result Value Ref Range     Troponin I, High Sensitivity 17 (H) 0 - 13 ng/L      XR pelvis 1-2 views     Result Date: 8/25/2024  Interpreted By:  Amada Chang, STUDY: XR PELVIS 1-2 VIEWS; ;  8/25/2024 8:12 am   INDICATION: Signs/Symptoms:right hip/upper femur pain.    COMPARISON: None.   ACCESSION NUMBER(S): [id number]   ORDERING CLINICIAN: ALLISON NEIL   FINDINGS: AP view of the pelvis shows an acute right femoral neck fracture with foreshortening. The remaining bones and joints appear intact        Acute displaced right femoral neck fracture     MACRO: None   Signed by: Amada Chang 8/25/2024 8:29 AM Dictation workstation:   [id number]     XR femur right 2+ views     Result Date: 8/25/2024  Interpreted By:  Amada Chang, STUDY: XR FEMUR RIGHT 2+ VIEWS; ;  8/25/2024 8:12 am   INDICATION: Signs/Symptoms:fall/tender proximal.   COMPARISON: None.   ACCESSION NUMBER(S): [id number]   ORDERING CLINICIAN: CHIKA MARTINEZ   FINDINGS: Two views right femur   There is a fracture through the right femoral neck with foreshortening. Degenerative changes are seen on the greater trochanter. Sclerosis of the distal right femur probably represents a bone infarct.        Acute fracture of the neck of the right femur with foreshortening     MACRO: None   Signed by: Amada Chang 8/25/2024 8:28 AM Dictation workstation:   [id number]     XR chest 1 view     Result Date: 8/25/2024  Interpreted By:  Amada Chang, STUDY: XR CHEST 1 VIEW 8/25/2024 8:12 am   INDICATION: Signs/Symptoms:fall   COMPARISON: None   ACCESSION NUMBER(S): [id number]   ORDERING CLINICIAN: CHIKA MARTINEZ   TECHNIQUE: AP view   FINDINGS: Is enlargement of the cardiac silhouette. Pulmonary vascularity is normal. No infiltrates. No pneumothorax or pleural effusion. Healed right rib fractures are noted        No acute abnormalities   Signed by: Amada Chang 8/25/2024 8:27 AM Dictation workstation:   [id number]     CT cervical spine wo IV contrast     Result Date: 8/25/2024  Interpreted By:  Amada Chang, STUDY: CT CERVICAL SPINE WO IV CONTRAST;  8/25/2024 7:00 am   INDICATION: Signs/Symptoms:fall.   COMPARISON: None.   ACCESSION NUMBER(S): [id number]   ORDERING CLINICIAN: CHIKA MARTINEZ   TECHNIQUE: Axial CT images of the  cervical spine are obtained. Axial, coronal and sagittal reconstructions are provided for review.   All CT exams are performed with 1 or more of the following dose reduction techniques: Automatic exposure control, adjustment of mA and/or kv according to patient size, or use of iterative reconstruction techniques.   FINDINGS: There is straightening of the cervical lordosis. Multilevel disc space narrowing and spurring noted with facet and uncovertebral joint hypertrophy. Arthritic pseudo subluxation of C2 on C3 and C7 on T1 noted bony encroachment on neural foramina due to uncovertebral joint hypertrophy most severe involving the right C6-7 foramen.   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 Aortic arch has short axis measurement of 3.3 cm          1. No acute fracture or traumatic subluxation 2. Multilevel cervical spondylosis   MACRO: None   Signed by: Amada Chang 8/25/2024 8:10 AM Dictation workstation:   [id number]     CT thoracic spine wo IV contrast     Result Date: 8/25/2024  Interpreted By:  Amada Chang, STUDY: CT THORACIC SPINE WO IV CONTRAST;  8/25/2024 7:00 am   INDICATION: Signs/Symptoms:fall.   COMPARISON: None.   ACCESSION NUMBER(S): [id number]   ORDERING CLINICIAN: CHIKA MARTINEZ   TECHNIQUE: Axial CT images of the thoracic spine are obtained. Axial, coronal and sagittal reconstructions are submitted for review.   FINDINGS: There is an acute compression fracture of the superior endplate of L1 with minimal retropulsion of the posterosuperior endplate.   The remaining thoracic vertebral body heights are maintained. Alignment is satisfactory. Disc space narrowing and spurring is seen throughout the thoracic spine. No bony canal stenosis or bony foraminal stenosis noted.   Incidental note is made of an aberrant right subclavian artery. Ectasia of the ascending thoracic aorta measures 3.9 cm short axis. Mild cardiomegaly with mild atherosclerotic coronary artery  calcifications. Coarsened interstitial markings in the lung bases probably due to chronic change mild scoliosis noted.          1. Diffuse thoracic spondylosis and mild scoliosis. No acute bony abnormalities in the thoracic spine. 2. Acute compression fracture of L1     MACRO: None   Signed by: Amada Chang 8/25/2024 8:04 AM Dictation workstation:   [id number]     CT lumbar spine wo IV contrast     Result Date: 8/25/2024  Interpreted By:  Amada Chang, STUDY: CT LUMBAR SPINE WO IV CONTRAST  8/25/2024 7:00 am   INDICATION: Signs/Symptoms:fall, history of recent lumbar fracture   COMPARISON: None.   ACCESSION NUMBER(S): [id number]   ORDERING CLINICIAN: CHIKA MARTINEZ   TECHNIQUE: Axial CT images of the lumbar spine are obtained. Axial, coronal and sagittal reconstructions are provided for review.   All CT exams are performed with 1 or more of the following dose reduction techniques: Automatic exposure control, adjustment of mA and/or kv according to patient size, or use of iterative reconstruction techniques.   FINDINGS: T12-L1: There is an acute compression fracture of the superior endplate of L1 with 2 mm of retropulsion of the posterosuperior endplate. Minimal bony canal stenosis. No foraminal stenosis.   L1-2: There is a chronic appearing compression fracture of the superior endplate of L2. There is a broad-based disc bulge. There is no bony canal or bony foraminal stenosis. Schmorl's node also noted in the superior endplate of L2.   L2-3: Small posterior osteophyte disc complex effaces the ventral thecal sac. Posterior element hypertrophy is noted. There is lumbar canal stenosis. No bony foraminal stenosis.   L3-4: There is 2 mm of anterior subluxation of L3 on L4 with a broad-based disc bulge and posterior element hypertrophy. Canal stenosis due to degenerative changes. No bony foraminal stenosis   L4-5: Slight disc space narrowing with broad-based disc bulge and posterior element hypertrophy. Canal stenosis  noted due to degenerative changes.   L5-S1: Broad-based disc bulge. No bony canal or bony foraminal stenosis.   Additional findings include hyperdense material layering in the gallbladder and colonic diverticulosis.        1. Acute compression fracture of L1 with 2 mm of retropulsion of the posterosuperior endplate causing minimal bony canal stenosis. 2. Remote appearing L2 compression fracture 3. Multilevel lumbar spondylosis   MACRO: None   Signed by: Amada Chang 8/25/2024 8:00 AM Dictation workstation:   [id number]     CT head wo IV contrast     Result Date: 8/25/2024  Interpreted By:  Amada Chang, STUDY: CT HEAD WO IV CONTRAST;  8/25/2024 7:00 am   INDICATION: Signs/Symptoms:fall.   COMPARISON: None..   ACCESSION NUMBER(S): [id number]   ORDERING CLINICIAN: CHIKA MARTINEZ   TECHNIQUE: CT axial images through the Brain were obtained without contrast.   All CT exams are performed with 1 or more of the following dose reduction techniques: Automatic exposure control, adjustment of mA and/or kv according to patient size, or use of iterative reconstruction techniques.   FINDINGS: There is mass effect or acute intracranial hemorrhage. Tiny remote left basal ganglia lacunar infarct. Ventricles are enlarged likely related to diffuse cerebral atrophy. Gray-white differentiation is maintained.   Paranasal sinus mucosal thickening noted.. Lens replacements are seen.        No acute intracranial abnormality.   MACRO: None.   Signed by: Amada Chang 8/25/2024 7:54 AM Dictation workstation:   [id number]            Assessment/Plan        Assessment & Plan  Fall, initial encounter  Acute displaced right femoral neck fracture  Mechanical fall  History of recent L1 compression fracture  Osteoporosis  -Orthopedic surgery consult and recommendations appreciated  -Imaging results as above  -N.p.o. for possible surgical intervention  -Pain control  -Urinalysis ordered  -PT/OT, fall precautions     Elevated high-sensitivity  troponins  Hyperlipidemia  Hypertension  -Initial troponin 17, delta troponin 17.  Third ordered, trend  -EKG, per ED physician, shows normal sinus rate and rhythm with frequent PVCs.  No STEMI     Hypokalemia  -Potassium 2.9 on arrival, replacement ordered in the ED  -Monitor with BMP in the a.m.     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 for possible surgical intervention, continue home medications when able     DVT prophylaxis  -No chemical anticoagulation due to possible surgical intervention  -SCDs     Patient fully evaluated on August 25.  Critical correct electrolyte disturbances before surgical intervention.     I spent 45 minutes in the professional and overall care of this patient.                        Revision History       Patient fully evaluated on 8/27/24. Reviewed labs and medications. On physical exam she was tired and lethargic and using an abductor pillow. Will continue to monitor her and check AM labs. Pt understood and agreeable to plan. I spent 50 minutes in the overall and professional care of this patient.        Patient fully evaluated 8/28, significant clinical improvement noted, patient medically cleared for discharge today. Patient alert, awake, and smiling in bed, denies acute difficulties at this time, medications and labs reviewed, continue current and repeat labs in the AM if patient still hospitalized. Patient aware and agreeable to current plan, continue plan as above. I spent 30 minutes in the professional and overall care of this patient.                Revision History         Pertinent Physical Exam At Time of Discharge  Physical Exam    Outpatient Follow-Up  No future appointments.      Jenna Kraus

## 2024-08-28 NOTE — PROGRESS NOTES
Occupational Therapy    OT Treatment    Patient Name: Della Gordillo  MRN: [id number]  Today's Date: 8/28/2024  Time Calculation  Start Time: 1300  Stop Time: 1324  Time Calculation (min): 24 min        Assessment:  End of Session Communication: Bedside nurse, PCT/NA/CTA  End of Session Patient Position: Bed, 2 rail up, Alarm on     Plan:  Treatment Interventions: ADL retraining, Functional transfer training, UE strengthening/ROM, Endurance training, Neuromuscular reeducation, Compensatory technique education  OT Frequency: 4 times per week  OT Discharge Recommendations: Moderate intensity level of continued care, 24 hr supervision due to cognition  OT - OK to Discharge: Yes (from an O.T. standpoint)  Treatment Interventions: ADL retraining, Functional transfer training, UE strengthening/ROM, Endurance training, Neuromuscular reeducation, Compensatory technique education    Subjective   Previous Visit Info:  OT Last Visit  OT Received On: 08/28/24  General:  General  Co-Treatment: PT  Co-Treatment Reason: to maximize pt safety and function  Prior to Session Communication: Bedside nurse  Patient Position Received: Bed, 2 rail up, Alarm on  General Comment: pleasantly confused and in agreement for therapy session  Precautions:  Hearing/Visual Limitations: [place], hearing aids donned during session  LE Weight Bearing Status: Weight Bearing as Tolerated  Medical Precautions: Fall precautions  Post-Surgical Precautions: Right hip precautions            Pain:  Pain Assessment  Pain Assessment:  (c/o pain in R LE with movement but unable to rate on pain scale)    Objective         Activities of Daily Living: Grooming  Grooming Level of Assistance: Contact guard  Grooming Where Assessed: Edge of bed  Grooming Comments: wash face with CGA for balance    UE Bathing  UE Bathing Level of Assistance: Minimum assistance  UE Bathing Where Assessed: Edge of bed    LE Bathing  LE Bathing Level of Assistance: Dependent  LE Bathing Where  Assessed:  (standing at side of bed d/t incontinence of urine)    UE Dressing  UE Dressing Level of Assistance: Moderate assistance  UE Dressing Where Assessed: Edge of bed  UE Dressing Comments: doff/don gown    LE Dressing  LE Dressing: Yes  Adult Briefs Level of Assistance: Dependent  LE Dressing Where Assessed: Edge of bed  Functional Standing Tolerance:  Time: 2:00 standing at FWW  Bed Mobility/Transfers: Bed Mobility  Bed Mobility: Yes  Bed Mobility 1  Bed Mobility 1: Supine to sitting, Sitting to supine  Level of Assistance 1: Maximum assistance (x2)    Transfers  Transfer: Yes  Transfer 1  Technique 1: Sit to stand, Stand to sit  Transfer Device 1: Walker  Transfer Level of Assistance 1: Maximum assistance (x2)  Trials/Comments 1: pt completed STS from EOB x2 trials      Functional Mobility:  Functional Mobility  Functional Mobility Performed:  (unable to safely progress)      Outcome Measures:[facility] Daily Activity  Putting on and taking off regular lower body clothing: Total  Bathing (including washing, rinsing, drying): A lot  Putting on and taking off regular upper body clothing: A lot  Toileting, which includes using toilet, bedpan or urinal: Total  Taking care of personal grooming such as brushing teeth: A little  Eating Meals: A little  Daily Activity - Total Score: 12        Education Documentation  ADL Training, taught by GABE Powers at 8/28/2024  2:47 PM.  Learner: Patient  Readiness: Acceptance  Method: Explanation  Response: Verbalizes Understanding, Needs Reinforcement    Education Comments  No comments found.               Goals:  Encounter Problems       Encounter Problems (Active)       OT Goals       Increase UE dressing and grooming to SBA; increase LE dressing to max assist with adaptive equipment.  (Progressing)       Start:  08/27/24    Expected End:  09/10/24            Increase bed mobility to mod assist with rails for safety (Progressing)       Start:  08/27/24    Expected  End:  09/10/24            Increase functional transfers to/from bed, chair & commode to mod assist x 2 with DME for safety (Progressing)       Start:  08/27/24    Expected End:  09/10/24            Demonstrate compliance to post op precautions and walker safety techs with min cues during ADLs/functional transfers (Progressing)       Start:  08/27/24    Expected End:  09/10/24

## 2024-08-28 NOTE — PROGRESS NOTES
08/28/24 1346   Discharge Planning   Home or Post Acute Services Post acute facilities (Rehab/SNF/etc)   Type of Post Acute Facility Services Skilled nursing   Expected Discharge Disposition SNF   Does the patient need discharge transport arranged? Yes   RoundTrip coordination needed? Yes   Has discharge transport been arranged? Yes   What day is the transport expected? 08/28/24   What time is the transport expected? 1500     [facility] able to accept today. Requesting COVID test. RN collected and sent, awaiting results. Transport scheduled for 3PM today.  left with daughterDella to update. Awaiting call back.     UPDATE 1435: Daughter Della updated on discharge  at 3PM.     JOSE ABBASI RN TCC

## 2024-08-28 NOTE — PROGRESS NOTES
"ORTHOPAEDIC SURGERY POST-OP PROGRESS NOTE       SERVICE DATE: 8/28/2024   SERVICE TIME:  9:44 AM    Subjective   Seen and examined at bedside, resting comfortably.  Right hip pain well controlled.  No overnight issues.  Denies numbness or tingling RLE.        Objective   VITAL SIGNS: /61 (BP Location: Left arm, Patient Position: Lying)   Pulse 90   Temp 37.3 °C (99.1 °F) (Temporal)   Resp 18   Ht 1.676 m (5' 6\")   Wt 68 kg (150 lb)   SpO2 92%   BMI 24.21 kg/m²   INTAKE AND OUTPUT:     Intake/Output Summary (Last 24 hours) at 8/28/2024 0944  Last data filed at 8/28/2024 0800  Gross per 24 hour   Intake 360 ml   Output --   Net 360 ml            PHYSICAL EXAMINATION:  Right Lower Extremity:    Dorsalis pedis pulses palpable.    Posterior tibial pulses palpable.    Dorsi flexion 5/5.    Plantar flexion 5/5.    Extensor hallucis extension: 5/5.    Sensory intact to light touch L1-S1.    Dressing intact.    Surgical site no drainage.       Problem Review and Assessment: Patient monitored, no new events overnight.    Patient Active Problem List   Diagnosis    Fall, initial encounter    Closed fracture of neck of right femur (Multi)       LABS:  Results for orders placed or performed during the hospital encounter of 08/25/24 (from the past 24 hour(s))   CBC and Auto Differential   Result Value Ref Range    WBC 9.3 4.4 - 11.3 x10*3/uL    nRBC 0.0 0.0 - 0.0 /100 WBCs    RBC 3.35 (L) 4.00 - 5.20 x10*6/uL    Hemoglobin 10.2 (L) 12.0 - 16.0 g/dL    Hematocrit 30.6 (L) 36.0 - 46.0 %    MCV 91 80 - 100 fL    MCH 30.4 26.0 - 34.0 pg    MCHC 33.3 32.0 - 36.0 g/dL    RDW 12.9 11.5 - 14.5 %    Platelets 244 150 - 450 x10*3/uL    Neutrophils % 68.7 40.0 - 80.0 %    Immature Granulocytes %, Automated 0.9 0.0 - 0.9 %    Lymphocytes % 17.1 13.0 - 44.0 %    Monocytes % 13.0 2.0 - 10.0 %    Eosinophils % 0.2 0.0 - 6.0 %    Basophils % 0.1 0.0 - 2.0 %    Neutrophils Absolute 6.37 (H) 1.60 - 5.50 x10*3/uL    Immature " Granulocytes Absolute, Automated 0.08 0.00 - 0.50 x10*3/uL    Lymphocytes Absolute 1.58 0.80 - 3.00 x10*3/uL    Monocytes Absolute 1.20 (H) 0.05 - 0.80 x10*3/uL    Eosinophils Absolute 0.02 0.00 - 0.40 x10*3/uL    Basophils Absolute 0.01 0.00 - 0.10 x10*3/uL   Comprehensive metabolic panel   Result Value Ref Range    Glucose 114 (H) 74 - 99 mg/dL    Sodium 135 (L) 136 - 145 mmol/L    Potassium 3.5 3.5 - 5.3 mmol/L    Chloride 101 98 - 107 mmol/L    Bicarbonate 26 21 - 32 mmol/L    Anion Gap 12 10 - 20 mmol/L    Urea Nitrogen 15 6 - 23 mg/dL    Creatinine 0.71 0.50 - 1.05 mg/dL    eGFR 77 >60 mL/min/1.73m*2    Calcium 8.8 8.6 - 10.3 mg/dL    Albumin 3.1 (L) 3.4 - 5.0 g/dL    Alkaline Phosphatase 71 33 - 136 U/L    Total Protein 5.7 (L) 6.4 - 8.2 g/dL    AST 20 9 - 39 U/L    Bilirubin, Total 1.8 (H) 0.0 - 1.2 mg/dL    ALT 4 (L) 7 - 45 U/L       POST OPERATIVE COMPLICATIONS:  Complicated by: None         ASSESSMENT:   S/P Procedure(s) (LRB):  RIGHT BIPOLAR HEMIARTHROPLASTY (Right) on 8/26/2024    POST-OP PLAN:   Physical Therapy evaluation - WBAT RLE, lateral hip precautions  Ok to DC abduction pillow  DVT prophylaxis - ASA 81mg BID x 28 days , IPDs  Dressing - Mepilex x 7 days  Pain control - Multimodal  Antibiotics - Ancef x 24 hours  Case Management for discharge planning - Plan for discharge to [facility]  Follow up outpatient in 2 weeks for repeat radiographs and wound check/staple removal     Woody aCstillo, DO  Orthopaedic Surgery  Sports Medicine & Shoulder  [facility]   [phone]

## 2024-08-28 NOTE — PROGRESS NOTES
Physical Therapy    Physical Therapy Treatment    Patient Name: Della Gordillo  MRN: [id number]  Today's Date: 8/28/2024  Time Calculation  Start Time: 1301  Stop Time: 1331  Time Calculation (min): 30 min         Assessment/Plan         PT Plan  Treatment/Interventions: Bed mobility, Transfer training, Gait training  PT Plan: Ongoing PT  PT Frequency: 4 times per week  PT Discharge Recommendations: Moderate intensity level of continued care  PT - OK to Discharge: Yes      General Visit Information:   PT  Visit  PT Received On: 08/28/24       Subjective          Vital Signs (Past 2hrs)                 Objective   Pain: r hip no rating                            Treatments:  Therapeutic Exercise  Therapeutic Exercise Performed:  (supine ble arom exs x 10 reps)    Bed Mobility  Bed Mobility:  (supine to sit max a x 2  sit to supine dependent assist x 2   patient sat unsupported on edge of bed 15minutes min a x 1)       Transfers  Transfer:  (sit to stand max a x 2   stood with walker 2x for one minute each max a x 1)         Outcome Measures:  [facility] Basic Mobility  Turning from your back to your side while in a flat bed without using bedrails: A lot  Moving from lying on your back to sitting on the side of a flat bed without using bedrails: A lot  Moving to and from bed to chair (including a wheelchair): Total  Standing up from a chair using your arms (e.g. wheelchair or bedside chair): A lot  To walk in hospital room: Total  Climbing 3-5 steps with railing: Total  Basic Mobility - Total Score: 9    Education Documentation  Precautions, taught by Arabella Cardenas PTA at 8/28/2024  2:31 PM.  Learner: Patient  Readiness: Acceptance  Method: Demonstration  Response: Demonstrated Understanding    Mobility Training, taught by Arabella Cardenas PTA at 8/28/2024  2:31 PM.  Learner: Patient  Readiness: Acceptance  Method: Demonstration  Response: Demonstrated Understanding    Education Comments  No comments found.                Encounter Problems       Encounter Problems (Active)       PT Problem       STG - Pt will transition supine <> sitting with mod A x 2  (Progressing)       Start:  08/27/24    Expected End:  09/10/24            STG - Pt will transfer STS with mod A x 2  (Progressing)       Start:  08/27/24    Expected End:  09/10/24            STG - Pt will amb 3'' using RW with mod A x 2  (Progressing)       Start:  08/27/24    Expected End:  09/10/24               Pain - Adult

## 2024-08-28 NOTE — CARE PLAN
The patient's goals for the shift include      The clinical goals for the shift include Pain management; Maintain comfort and safety measures      Problem: Skin  Goal: Decreased wound size/increased tissue granulation at next dressing change  Outcome: Progressing  Goal: Participates in plan/prevention/treatment measures  Outcome: Progressing  Goal: Prevent/manage excess moisture  Outcome: Progressing  Goal: Prevent/minimize sheer/friction injuries  Outcome: Progressing  Goal: Promote/optimize nutrition  Outcome: Progressing  Goal: Promote skin healing  Outcome: Progressing     Problem: Fall/Injury  Goal: Not fall by end of shift  Outcome: Progressing  Goal: Be free from injury by end of the shift  Outcome: Progressing  Goal: Verbalize understanding of personal risk factors for fall in the hospital  Outcome: Progressing  Goal: Verbalize understanding of risk factor reduction measures to prevent injury from fall in the home  Outcome: Progressing  Goal: Use assistive devices by end of the shift  Outcome: Progressing  Goal: Pace activities to prevent fatigue by end of the shift  Outcome: Progressing     Problem: Pain - Adult  Goal: Verbalizes/displays adequate comfort level or baseline comfort level  Outcome: Progressing     Problem: Safety - Adult  Goal: Free from fall injury  Outcome: Progressing     Problem: Discharge Planning  Goal: Discharge to home or other facility with appropriate resources  Outcome: Progressing     Problem: Chronic Conditions and Co-morbidities  Goal: Patient's chronic conditions and co-morbidity symptoms are monitored and maintained or improved  Outcome: Progressing     Problem: Pain  Goal: Takes deep breaths with improved pain control throughout the shift  Outcome: Progressing  Goal: Turns in bed with improved pain control throughout the shift  Outcome: Progressing  Goal: Walks with improved pain control throughout the shift  Outcome: Progressing  Goal: Performs ADL's with improved pain  control throughout shift  Outcome: Progressing  Goal: Participates in PT with improved pain control throughout the shift  Outcome: Progressing  Goal: Free from opioid side effects throughout the shift  Outcome: Progressing  Goal: Free from acute confusion related to pain meds throughout the shift  Outcome: Progressing

## 2024-08-28 NOTE — CARE PLAN
The patient's goals for the shift include      The clinical goals for the shift include   Problem: Skin  Goal: Decreased wound size/increased tissue granulation at next dressing change  Outcome: Progressing  Goal: Participates in plan/prevention/treatment measures  Outcome: Progressing  Goal: Prevent/manage excess moisture  Outcome: Progressing  Goal: Prevent/minimize sheer/friction injuries  Outcome: Progressing  Goal: Promote/optimize nutrition  Outcome: Progressing  Goal: Promote skin healing  Outcome: Progressing     Problem: Fall/Injury  Goal: Not fall by end of shift  Outcome: Progressing  Goal: Be free from injury by end of the shift  Outcome: Progressing  Goal: Verbalize understanding of personal risk factors for fall in the hospital  Outcome: Progressing  Goal: Verbalize understanding of risk factor reduction measures to prevent injury from fall in the home  Outcome: Progressing  Goal: Use assistive devices by end of the shift  Outcome: Progressing  Goal: Pace activities to prevent fatigue by end of the shift  Outcome: Progressing     Problem: Pain - Adult  Goal: Verbalizes/displays adequate comfort level or baseline comfort level  Outcome: Progressing     Problem: Safety - Adult  Goal: Free from fall injury  Outcome: Progressing     Problem: Discharge Planning  Goal: Discharge to home or other facility with appropriate resources  Outcome: Progressing     Problem: Chronic Conditions and Co-morbidities  Goal: Patient's chronic conditions and co-morbidity symptoms are monitored and maintained or improved  Outcome: Progressing     Problem: Pain  Goal: Takes deep breaths with improved pain control throughout the shift  Outcome: Progressing  Goal: Turns in bed with improved pain control throughout the shift  Outcome: Progressing  Goal: Walks with improved pain control throughout the shift  Outcome: Progressing  Goal: Performs ADL's with improved pain control throughout shift  Outcome: Progressing  Goal:  Participates in PT with improved pain control throughout the shift  Outcome: Progressing  Goal: Free from opioid side effects throughout the shift  Outcome: Progressing  Goal: Free from acute confusion related to pain meds throughout the shift  Outcome: Progressing

## 2024-08-28 NOTE — CARE PLAN
Problem: Skin  Goal: Decreased wound size/increased tissue granulation at next dressing change  8/28/2024 1015 by Isabel Bojorquez RN  Outcome: Progressing  Flowsheets (Taken 8/28/2024 1015)  Decreased wound size/increased tissue granulation at next dressing change:   Promote sleep for wound healing   Protective dressings over bony prominences  8/28/2024 1013 by Isabel Bojorquez RN  Outcome: Progressing  Goal: Participates in plan/prevention/treatment measures  8/28/2024 1015 by Isabel Bojorquez RN  Outcome: Progressing  Flowsheets (Taken 8/28/2024 1015)  Participates in plan/prevention/treatment measures:   Elevate heels   Increase activity/out of bed for meals  8/28/2024 1013 by Isabel Bojorquez RN  Outcome: Progressing  Goal: Prevent/manage excess moisture  8/28/2024 1015 by Isabel Bojorquez RN  Outcome: Progressing  Flowsheets (Taken 8/28/2024 1015)  Prevent/manage excess moisture:   Cleanse incontinence/protect with barrier cream   Follow provider orders for dressing changes  8/28/2024 1013 by Isabel Bojorquez RN  Outcome: Progressing  Goal: Prevent/minimize sheer/friction injuries  8/28/2024 1015 by Isabel Bojorquez RN  Outcome: Progressing  Flowsheets (Taken 8/28/2024 1015)  Prevent/minimize sheer/friction injuries:   HOB 30 degrees or less   Turn/reposition every 2 hours/use positioning/transfer devices   Increase activity/out of bed for meals  8/28/2024 1013 by Isabel Bojorquez RN  Outcome: Progressing  Goal: Promote/optimize nutrition  8/28/2024 1015 by Isabel Bojorquez RN  Outcome: Progressing  Flowsheets (Taken 8/28/2024 1015)  Promote/optimize nutrition: Monitor/record intake including meals  8/28/2024 1013 by Isabel Bojorquez RN  Outcome: Progressing  Goal: Promote skin healing  8/28/2024 1015 by Isabel Bojorquez RN  Outcome: Progressing  Flowsheets (Taken 8/28/2024 1015)  Promote skin healing: Assess skin/pad under line(s)/device(s)  8/28/2024 1013 by Isabel Bojorquez RN  Outcome: Progressing   T

## 2024-08-30 NOTE — DOCUMENTATION CLARIFICATION NOTE
"    PATIENT:               JOSUE JOSHI  ACCT #:                  [id number]  MRN:                       [id number]  :                       [id number]  ADMIT DATE:       2024 6:15 AM  DISCH DATE:        2024 3:05 PM  RESPONDING PROVIDER #:        [id number]          PROVIDER RESPONSE TEXT:    Acute blood loss anemia    CDI QUERY TEXT:    Clarification    Instruction:    Based on your assessment of the patient and the clinical information, please provide the requested documentation by clicking on the appropriate radio button and enter any additional information if prompted.    Question: Is there a diagnosis indicative of the lab values and clinical indicators    When answering this query, please exercise your independent professional judgment. The fact that a question is being asked, does not imply that any particular answer is desired or expected.    The patient's clinical indicators include:  Clinical Information  89 yo with right femur fracture    Clinical Indicators  H and P  \"Acute displaced right femoral neck fracture\"    24 Hgb 13.7  24 Hgb 11.1  24 Hgb 10.2     mls    Treatment  IV TXA, lab monitoring    Risk Factors  89 yo right right femur fracture treated with right hemiarthroplasty  Options provided:  -- Acute blood loss anemia  -- Anemia  -- Other - I will add my own diagnosis  -- Refer to Clinical Documentation Reviewer    Query created by: Chantel Romero on 2024 9:29 AM      Electronically signed by:  PETRA PHAM MD 2024 4:00 PM          "

## 2024-09-09 LAB
LABORATORY COMMENT REPORT: NORMAL
PATH REPORT.FINAL DX SPEC: NORMAL
PATH REPORT.GROSS SPEC: NORMAL
PATH REPORT.RELEVANT HX SPEC: NORMAL
PATH REPORT.TOTAL CANCER: NORMAL

## (undated) DEVICE — DRESSING, MEPILEX BORDER, POST-OP AG, 4 X 10 IN

## (undated) DEVICE — RETRIEVER, SUTURE, HEWSON

## (undated) DEVICE — Device

## (undated) DEVICE — TIP, FEMORAL, CANAL, COAXIAL

## (undated) DEVICE — WOUND SYSTEM, DEBRIDEMENT & CLEANING, O.R DUOPAK

## (undated) DEVICE — SPONGE, VAGINAL,  XRAY DECT, 2 X 72 , 4-PLY, W/MASTER DMT,STERILE

## (undated) DEVICE — DRAPE, SHEET, U, W/ADHESIVE STRIP, IMPERVIOUS, 60 X 70 IN, DISPOSABLE, LF, STERILE

## (undated) DEVICE — SPONGE, LAP, XRAY DECT, 4IN X 18IN, W/MASTER DMT, STERILE

## (undated) DEVICE — DRAPE, U-DRAPE, NON STERILE

## (undated) DEVICE — BOWL, MIXING, ADVANCED, CEMENT

## (undated) DEVICE — DRAPE, SHEET, THREE QUARTER, FAN FOLD, 57 X 77 IN

## (undated) DEVICE — TIP, SUCTION, SUPER SUCKER, KAM, MINI, CURVED

## (undated) DEVICE — BRUSH, INTRAMEDULLARY, FEMORAL

## (undated) DEVICE — RESTRICTOR, CEMENT, SMALL 18.5MM

## (undated) DEVICE — BANDAGE, COFLEX, 6 X 5 YDS, FOAM TAN, STERILE, LF

## (undated) DEVICE — PILLOW, ABDUCTION, MEDIUM

## (undated) DEVICE — FEMORAL CANAL TIP FOR INTERPULSE HANDPIECE SET

## (undated) DEVICE — NEEDLE, SPINAL, QUINCKE, 18 G X 3.5 IN, PINK HUB

## (undated) DEVICE — BLADE, SAGITTAL DUAL CUT, 25 X 90 X 1.27

## (undated) DEVICE — DRAPE, TIBURON, HIP W/POUCHES